# Patient Record
Sex: FEMALE | Race: OTHER | Employment: UNEMPLOYED | URBAN - METROPOLITAN AREA
[De-identification: names, ages, dates, MRNs, and addresses within clinical notes are randomized per-mention and may not be internally consistent; named-entity substitution may affect disease eponyms.]

---

## 2020-07-09 ENCOUNTER — HOSPITAL ENCOUNTER (EMERGENCY)
Age: 35
Discharge: HOME | End: 2020-07-09
Attending: EMERGENCY MEDICINE
Payer: MEDICAID

## 2020-07-09 ENCOUNTER — APPOINTMENT (OUTPATIENT)
Dept: GENERAL RADIOLOGY | Age: 35
End: 2020-07-09
Payer: MEDICAID

## 2020-07-09 VITALS
RESPIRATION RATE: 15 BRPM | HEART RATE: 83 BPM | HEIGHT: 64 IN | SYSTOLIC BLOOD PRESSURE: 122 MMHG | WEIGHT: 167 LBS | BODY MASS INDEX: 28.51 KG/M2 | OXYGEN SATURATION: 98 % | DIASTOLIC BLOOD PRESSURE: 78 MMHG | TEMPERATURE: 97.9 F

## 2020-07-09 PROCEDURE — U0003 INFECTIOUS AGENT DETECTION BY NUCLEIC ACID (DNA OR RNA); SEVERE ACUTE RESPIRATORY SYNDROME CORONAVIRUS 2 (SARS-COV-2) (CORONAVIRUS DISEASE [COVID-19]), AMPLIFIED PROBE TECHNIQUE, MAKING USE OF HIGH THROUGHPUT TECHNOLOGIES AS DESCRIBED BY CMS-2020-01-R: HCPCS

## 2020-07-09 PROCEDURE — 93005 ELECTROCARDIOGRAM TRACING: CPT | Performed by: EMERGENCY MEDICINE

## 2020-07-09 PROCEDURE — 99284 EMERGENCY DEPT VISIT MOD MDM: CPT

## 2020-07-09 PROCEDURE — 71045 X-RAY EXAM CHEST 1 VIEW: CPT

## 2020-07-09 RX ORDER — AZITHROMYCIN 250 MG/1
TABLET, FILM COATED ORAL
Qty: 1 PACKET | Refills: 0 | Status: SHIPPED | OUTPATIENT
Start: 2020-07-09 | End: 2020-07-13

## 2020-07-09 ASSESSMENT — ENCOUNTER SYMPTOMS
SHORTNESS OF BREATH: 0
ABDOMINAL PAIN: 0
BACK PAIN: 0
VOMITING: 0
RHINORRHEA: 0
COUGH: 1
NAUSEA: 0
EYES NEGATIVE: 1

## 2020-07-09 ASSESSMENT — PAIN DESCRIPTION - FREQUENCY: FREQUENCY: CONTINUOUS

## 2020-07-09 ASSESSMENT — PAIN DESCRIPTION - PAIN TYPE: TYPE: ACUTE PAIN

## 2020-07-09 ASSESSMENT — PAIN DESCRIPTION - DESCRIPTORS: DESCRIPTORS: PRESSURE

## 2020-07-09 ASSESSMENT — PAIN DESCRIPTION - LOCATION: LOCATION: CHEST

## 2020-07-09 ASSESSMENT — PAIN DESCRIPTION - ORIENTATION: ORIENTATION: MID

## 2020-07-09 ASSESSMENT — PAIN DESCRIPTION - ONSET: ONSET: ON-GOING

## 2020-07-09 ASSESSMENT — PAIN SCALES - GENERAL: PAINLEVEL_OUTOF10: 3

## 2020-07-09 NOTE — ED PROVIDER NOTES
[address]      Pt Name: Bertha Diego  MRN: [id number]  [place] 1985  Date of evaluation: 7/9/2020  Provider: Monse Evans MD    [address] [place] [address]       Chief Complaint   Patient presents with    Chest Pain     for one dayhaving midsternal chest pain radiating to upper back area. denies sob or NV, states has a cough with some phlegm         HISTORY OF PRESENT ILLNESS   (Location/Symptom, Timing/Onset, Context/Setting, Quality, Duration, Modifying Factors, Severity)  Note limiting factors. Bertha Diego is a 37 y.o. female who presents to the emergency department for chest pain. Onset this morning, woke her up from sleep. Aching and pressure, nonradiating, no change w/ inspiration/exertion/position. Associated shortness of breath, nausea, vomiting, diaphoresis. Felt like she had a lot of phlegm, pain improved after she was able to clear phlegm. No hx of heart disease. No hx of VTE, leg pain/swelling, hemoptysis, syncope, recent surgery/immobilization. No tobacco use. No drug use. Occasional alcohol use. History provided by patient via in person interpretation by daughter. Declined telephone . Chart review: hx of DM on metformin    Nursing Notes were reviewed. REVIEW OF SYSTEMS    (2-9 systems for level 4, 10 or more for level 5)     Review of Systems   Constitutional: Negative for appetite change, chills and fever. HENT: Negative for congestion and rhinorrhea. Eyes: Negative. Respiratory: Positive for cough. Negative for shortness of breath. Cardiovascular: Positive for chest pain. Negative for leg swelling. Gastrointestinal: Negative for abdominal pain, nausea and vomiting. Genitourinary: Negative for decreased urine volume, difficulty urinating, dysuria, frequency and urgency. Musculoskeletal: Negative for back pain and neck pain. Skin: Negative.     Neurological: Negative for dizziness, syncope, weakness, light-headedness and numbness. Except as noted above the remainder of the review of systems was reviewed and negative. PAST MEDICAL HISTORY     Past Medical History:   Diagnosis Date    Diabetes mellitus ([address]          SURGICAL HISTORY     History reviewed. No pertinent surgical history. CURRENT MEDICATIONS       Previous Medications    METFORMIN (GLUCOPHAGE) 500 MG TABLET    Take 500 mg by mouth 2 times daily (with meals)       ALLERGIES     Patient has no allergy information on record. FAMILY HISTORY     History reviewed. No pertinent family history.        SOCIAL HISTORY       Social History     Socioeconomic History    Marital status:      Spouse name: None    Number of children: None    Years of education: None    Highest education level: None   Occupational History    None   Social Needs    Financial resource strain: None    Food insecurity     Worry: None     Inability: None    Transportation needs     Medical: None     Non-medical: None   Tobacco Use    Smoking status: Never Smoker    Smokeless tobacco: Never Used   Substance and Sexual Activity    Alcohol use: Yes     Comment: social    Drug use: Never    Sexual activity: None   Lifestyle    Physical activity     Days per week: None     Minutes per session: None    Stress: None   Relationships    Social connections     Talks on phone: None     Gets together: None     Attends Holiness service: None     Active member of club or organization: None     Attends meetings of clubs or organizations: None     Relationship status: None    Intimate partner violence     Fear of current or ex partner: None     Emotionally abused: None     Physically abused: None     Forced sexual activity: None   Other Topics Concern    None   Social History Narrative    None         PHYSICAL EXAM    (up to 7 for level 4, 8 or more for level 5)     ED Triage Vitals   BP Temp Temp src Pulse Resp SpO2 Height Weight   07/09/20 1336 07/09/20 1329 -- 07/09/20 1329 -- 07/09/20 1329 07/09/20 1334 07/09/20 1334   116/87 98.2 °F (36.8 °C)  98  97 % 5' 4\" (1.626 m) 167 lb (75.8 kg)       Physical Exam  Vitals signs and nursing note reviewed. Constitutional:       General: She is not in acute distress. Appearance: She is not toxic-appearing. HENT:      Mouth/Throat:      Mouth: Mucous membranes are moist.      Pharynx: No oropharyngeal exudate or posterior oropharyngeal erythema. Eyes:      General: No scleral icterus. Extraocular Movements: Extraocular movements intact. Pupils: Pupils are equal, round, and reactive to light. Neck:      Musculoskeletal: Normal range of motion and neck supple. No neck rigidity. Cardiovascular:      Rate and Rhythm: Normal rate and regular rhythm. Pulses: Normal pulses. Heart sounds: Normal heart sounds. No murmur. Pulmonary:      Effort: Pulmonary effort is normal. No respiratory distress. Breath sounds: Normal breath sounds. No wheezing or rales. Abdominal:      General: There is no distension. Palpations: Abdomen is soft. Tenderness: There is no abdominal tenderness. Musculoskeletal: Normal range of motion. General: No swelling or tenderness. Skin:     General: Skin is warm and dry. Neurological:      Mental Status: She is alert and oriented to person, place, and time.       Comments: Strength 5/5 and sensation grossly intact to light touch and equal bilaterally in all extremities         DIAGNOSTIC RESULTS     EKG: All EKG's are interpreted by the Emergency Department Physician who either signs or Co-signs this chart in the absence of a cardiologist.    Normal sinus rhythm, vent rate 77bpm, normal axis and intervals, no acute injury pattern, no prior EKG on file for comparison    RADIOLOGY:   Non-plain film images such as CT, Ultrasound and MRI are read by the radiologist. Plain radiographic images are visualized and preliminarily interpreted by the emergency physician with the below findings:    CXR concerning for bilateral lower lung field airspace disease    Interpretation per the Radiologist below, if available at the time of this note:    XR CHEST PORTABLE   Final Result   Findings concerning for airspace disease in the lower lungs. ED BEDSIDE ULTRASOUND:   Performed by ED Physician - none    LABS:  Labs Reviewed   PREGNANCY, URINE   COVID-19   COVID-19   COVID-19     COVID-19 ordered and pending at time of discharge    All other labs were within normal range or not returned as of this dictation. EMERGENCY DEPARTMENT COURSE and DIFFERENTIAL DIAGNOSIS/MDM:   Vitals:    Vitals:    07/09/20 1329 07/09/20 1334 07/09/20 1336 07/09/20 1407   BP:   116/87    Pulse: 98      Temp: 98.2 °F (36.8 °C)   98.2 °F (36.8 °C)   TempSrc:    Oral   SpO2: 97%      Weight:  167 lb (75.8 kg)     Height:  5' 4\" (1.626 m)         42yo female w/ hx DM presenting w/ chest pain, resolved at time of evaluation. Well appearing, afebrile, hemodynamically stable, and in no acute distress. Breathing comfortably on room air. Low risk HEART score and PERC negative. EKG not suggestive of acute myocardial ischemia. Story not strongly concerning for pneumonia, however CXR shows bilateral airspace opacities. Possible concern for COVID, will swab with plan for outpatient follow up of results. Advised 14 day self quarantine at home. Outpatient PCP referral made. Will treat for CAP at this time given CXR findings. Discussed findings and detailed return precautions with patient and daughter and patient agreed w/ plan for discharge home and outpatient f/u with PCP. PROCEDURES:  Unless otherwise noted below, none     Procedures    FINAL IMPRESSION      1. Chest pain, unspecified type    2.  Community acquired pneumonia, unspecified laterality          DISPOSITION/PLAN   DISPOSITION Decision To Discharge 07/09/2020 03:08:24 PM      PATIENT REFERRED Marium Robledo PCP Referral Line  [phone]    Call for next available follow up appointment      DISCHARGE MEDICATIONS:  New Prescriptions    AZITHROMYCIN (ZITHROMAX Z-ANEESH) 250 MG TABLET    Take 2 tablets (500 mg) on Day 1, and then take 1 tablet (250 mg) on days 2 through 5. Controlled Substances Monitoring:     No flowsheet data found.     (Please note that portions of this note were completed with a voice recognition program.  Efforts were made to edit the dictations but occasionally words are mis-transcribed.)    Walker Haddad MD (electronically signed)  Attending Emergency Physician            Walker Haddad MD  07/09/20 2039

## 2020-07-09 NOTE — ED NOTES
Bed: 34  Expected date:   Expected time:   Means of arrival:   Comments:  Bill Palacios, BALDO  07/09/20 2778 Refilled Simvastatin per protocol

## 2020-07-10 ENCOUNTER — CARE COORDINATION (OUTPATIENT)
Dept: CARE COORDINATION | Age: 35
End: 2020-07-10

## 2020-07-10 LAB
EKG ATRIAL RATE: 77 BPM
EKG P AXIS: 36 DEGREES
EKG P-R INTERVAL: 182 MS
EKG Q-T INTERVAL: 368 MS
EKG QRS DURATION: 74 MS
EKG QTC CALCULATION (BAZETT): 416 MS
EKG R AXIS: 17 DEGREES
EKG T AXIS: 26 DEGREES
EKG VENTRICULAR RATE: 77 BPM

## 2020-07-10 PROCEDURE — 93010 ELECTROCARDIOGRAM REPORT: CPT | Performed by: INTERNAL MEDICINE

## 2020-07-10 NOTE — CARE COORDINATION
COVID-19 ED/Flu Clinic Initial Outreach Note    This Teto Michele made an attempt to contact the patient using [email or web address]  services by telephone to perform post discharge call. There was no answer and no voice mailbox to leave a message. Will try once more tomorrow.      Patient was seen for chest pain

## 2020-07-11 ENCOUNTER — CARE COORDINATION (OUTPATIENT)
Dept: CARE COORDINATION | Age: 35
End: 2020-07-11

## 2020-07-11 LAB
SARS-COV-2: NOT DETECTED
SOURCE: NORMAL

## 2020-07-11 NOTE — CARE COORDINATION
COVID-19 ED/Flu Clinic Initial Outreach Note      This Ismael Mancera made second attempt to contact the patient using [employer]  services by telephone to perform post discharge call. Not able to leave message. Will no longer make an outreach.

## 2021-03-17 ENCOUNTER — NURSE TRIAGE (OUTPATIENT)
Dept: OTHER | Facility: CLINIC | Age: 36
End: 2021-03-17

## 2021-03-17 ENCOUNTER — TELEPHONE (OUTPATIENT)
Dept: ADMINISTRATIVE | Age: 36
End: 2021-03-17

## 2021-03-17 NOTE — TELEPHONE ENCOUNTER
Patient called [facility]ran at [facility] with red flag complaint to establish care with unknown PCP. Brief description of triage: see below    Pt transferred with [facility], [facility] interpretor. Triage indicates for patient to be seen within 2 weeks in office. Pt agreeable to POC. Care advice provided, patient verbalizes understanding; denies any other questions or concerns; instructed to call back for any new or worsening symptoms. Writer provided warm transfer to [address] [place] [address] at [facility] for appointment scheduling. Attention Provider: Thank you for allowing me to participate in the care of your patient. The patient was connected to triage in response to information provided to the ECC. Please do not respond through this encounter as the response is not directed to a shared pool. Reason for Disposition   Abdominal pain is a chronic symptom (recurrent or ongoing AND lasting > 4 weeks)    Answer Assessment - Initial Assessment Questions  1. LOCATION: \"Where does it hurt? \"       Pt stated pain is in the middle of her lower abdomen    2. RADIATION: \"Does the pain shoot anywhere else? \" (e.g., chest, back)      Pt stated sometimes radiates to someplace else    3. ONSET: \"When did the pain begin? \" (e.g., minutes, hours or days ago)       Pt stated the abdominal pain has been going on for 10 years with worsening    4. SUDDEN: \"Gradual or sudden onset? \"      Gradual per pt    5. PATTERN \"Does the pain come and go, or is it constant? \"     - If constant: \"Is it getting better, staying the same, or worsening? \"       (Note: Constant means the pain never goes away completely; most serious pain is constant and it progresses)      - If intermittent: \"How long does it last?\" \"Do you have pain now? \"      (Note: Intermittent means the pain goes away completely between bouts)      Intermittent; denies pain at this time    6. SEVERITY: \"How bad is the pain? \"  (e.g., Scale 1-10; mild, moderate, or severe)    - MILD (1-3): doesn't interfere with normal activities, abdomen soft and not tender to touch     - MODERATE (4-7): interferes with normal activities or awakens from sleep, tender to touch     - SEVERE (8-10): excruciating pain, doubled over, unable to do any normal activities       Denies pain at this time - states pain is an 8/10 when she does have pain    7. RECURRENT SYMPTOM: \"Have you ever had this type of abdominal pain before? \" If so, ask: \"When was the last time? \" and \"What happened that time? \"       Recurrent per pt    8. CAUSE: \"What do you think is causing the abdominal pain? \"      Pt states she was told she has ovarian cysts     9. RELIEVING/AGGRAVATING FACTORS: \"What makes it better or worse? \" (e.g., movement, antacids, bowel movement)      Pt states sometimes when she takes pain killers the pain improves    10. OTHER SYMPTOMS: \"Has there been any vomiting, diarrhea, constipation, or urine problems? \"        Pt states she feels like she needs to urinate (urgency) and also has constipation when she has the abdominal pain    11. PREGNANCY: \"Is there any chance you are pregnant? \" \"When was your last menstrual period? \"        Pt denies; 03/17/21 LMP    Protocols used: ABDOMINAL PAIN - [facility] - JONO MOSQUERA

## 2021-03-17 NOTE — TELEPHONE ENCOUNTER
Patient called in having abd pain not an established pt transferred call to [address] with  Erica Friedman.

## 2021-03-18 ENCOUNTER — OFFICE VISIT (OUTPATIENT)
Dept: PRIMARY CARE CLINIC | Age: 36
End: 2021-03-18
Payer: COMMERCIAL

## 2021-03-18 VITALS
TEMPERATURE: 97.8 F | RESPIRATION RATE: 16 BRPM | HEART RATE: 85 BPM | OXYGEN SATURATION: 99 % | SYSTOLIC BLOOD PRESSURE: 119 MMHG | DIASTOLIC BLOOD PRESSURE: 85 MMHG

## 2021-03-18 DIAGNOSIS — N94.6 DYSMENORRHEA: Primary | ICD-10-CM

## 2021-03-18 PROCEDURE — G8484 FLU IMMUNIZE NO ADMIN: HCPCS | Performed by: NURSE PRACTITIONER

## 2021-03-18 PROCEDURE — 1036F TOBACCO NON-USER: CPT | Performed by: NURSE PRACTITIONER

## 2021-03-18 PROCEDURE — G8427 DOCREV CUR MEDS BY ELIG CLIN: HCPCS | Performed by: NURSE PRACTITIONER

## 2021-03-18 PROCEDURE — G8419 CALC BMI OUT NRM PARAM NOF/U: HCPCS | Performed by: NURSE PRACTITIONER

## 2021-03-18 PROCEDURE — 99213 OFFICE O/P EST LOW 20 MIN: CPT | Performed by: NURSE PRACTITIONER

## 2021-03-22 ENCOUNTER — HOSPITAL ENCOUNTER (EMERGENCY)
Age: 36
Discharge: HOME | End: 2021-03-22
Payer: COMMERCIAL

## 2021-03-22 VITALS
DIASTOLIC BLOOD PRESSURE: 89 MMHG | OXYGEN SATURATION: 97 % | RESPIRATION RATE: 16 BRPM | SYSTOLIC BLOOD PRESSURE: 134 MMHG | HEART RATE: 108 BPM | TEMPERATURE: 97.1 F

## 2021-03-22 DIAGNOSIS — Z76.0 ENCOUNTER FOR MEDICATION REFILL: ICD-10-CM

## 2021-03-22 DIAGNOSIS — N76.0 VAGINITIS AND VULVOVAGINITIS: Primary | ICD-10-CM

## 2021-03-22 LAB
BACTERIA: ABNORMAL /HPF
BILIRUBIN URINE: NEGATIVE
BLOOD, URINE: ABNORMAL
CHP ED QC CHECK: NORMAL
CLARITY: CLEAR
CLUE CELLS: NORMAL
COLOR: YELLOW
GLUCOSE BLD-MCNC: 241 MG/DL
GLUCOSE URINE: >=1000 MG/DL
HCG, URINE, POC: NEGATIVE
KETONES, URINE: NEGATIVE MG/DL
LEUKOCYTE ESTERASE, URINE: NEGATIVE
Lab: NORMAL
METER GLUCOSE: 241 MG/DL (ref 74–99)
NEGATIVE QC PASS/FAIL: NORMAL
NITRITE, URINE: NEGATIVE
PH UA: 5.5 (ref 5–9)
POSITIVE QC PASS/FAIL: NORMAL
PROTEIN UA: NEGATIVE MG/DL
RBC UA: ABNORMAL /HPF (ref 0–2)
SOURCE WET PREP: NORMAL
SPECIFIC GRAVITY UA: 1.02 (ref 1–1.03)
TRICHOMONAS PREP: NORMAL
UROBILINOGEN, URINE: 1 E.U./DL
WBC UA: ABNORMAL /HPF (ref 0–5)
YEAST WET PREP: NORMAL

## 2021-03-22 PROCEDURE — 6360000002 HC RX W HCPCS: Performed by: NURSE PRACTITIONER

## 2021-03-22 PROCEDURE — 2580000003 HC RX 258

## 2021-03-22 PROCEDURE — 99283 EMERGENCY DEPT VISIT LOW MDM: CPT

## 2021-03-22 PROCEDURE — 6370000000 HC RX 637 (ALT 250 FOR IP): Performed by: NURSE PRACTITIONER

## 2021-03-22 PROCEDURE — 96372 THER/PROPH/DIAG INJ SC/IM: CPT

## 2021-03-22 PROCEDURE — 87591 N.GONORRHOEAE DNA AMP PROB: CPT

## 2021-03-22 PROCEDURE — 87210 SMEAR WET MOUNT SALINE/INK: CPT

## 2021-03-22 PROCEDURE — 82962 GLUCOSE BLOOD TEST: CPT

## 2021-03-22 PROCEDURE — 81001 URINALYSIS AUTO W/SCOPE: CPT

## 2021-03-22 PROCEDURE — 87491 CHLMYD TRACH DNA AMP PROBE: CPT

## 2021-03-22 RX ORDER — CEFTRIAXONE 1 G/1
500 INJECTION, POWDER, FOR SOLUTION INTRAMUSCULAR; INTRAVENOUS ONCE
Status: COMPLETED | OUTPATIENT
Start: 2021-03-22 | End: 2021-03-22

## 2021-03-22 RX ORDER — AZITHROMYCIN 250 MG/1
1000 TABLET, FILM COATED ORAL ONCE
Status: COMPLETED | OUTPATIENT
Start: 2021-03-22 | End: 2021-03-22

## 2021-03-22 RX ADMIN — AZITHROMYCIN 1000 MG: 250 TABLET, FILM COATED ORAL at 17:07

## 2021-03-22 RX ADMIN — WATER 10 ML: 1 INJECTION INTRAMUSCULAR; INTRAVENOUS; SUBCUTANEOUS at 17:07

## 2021-03-22 RX ADMIN — CEFTRIAXONE SODIUM 500 MG: 1 INJECTION, POWDER, FOR SOLUTION INTRAMUSCULAR; INTRAVENOUS at 17:07

## 2021-03-22 NOTE — ED PROVIDER NOTES
1.005 - 1.030    Blood, Urine TRACE-INTACT Negative    pH, UA 5.5 5.0 - 9.0    Protein, UA Negative Negative mg/dL    Urobilinogen, Urine 1.0 <2.0 E.U./dL    Nitrite, Urine Negative Negative    Leukocyte Esterase, Urine Negative Negative    WBC, UA 0-1 0 - 5 /HPF    RBC, UA 0-1 0 - 2 /HPF    Bacteria, UA RARE (A) None Seen /HPF   POC Pregnancy Urine Qual   Result Value Ref Range    HCG, Urine, POC Negative Negative    Lot Number [id number]     Positive QC Pass/Fail Acceptable     Negative QC Pass/Fail Acceptable    POCT glucose   Result Value Ref Range    Glucose 241 mg/dL    QC OK? ok    POCT Glucose   Result Value Ref Range    Meter Glucose 241 (H) 74 - 99 mg/dL     Imaging: All Radiology results interpreted by Radiologist unless otherwise noted. No orders to display     ED Course / Medical Decision Making     Medications   cefTRIAXone (ROCEPHIN) injection 500 mg (500 mg Intramuscular Given 3/22/21 1707)   azithromycin (ZITHROMAX) tablet 1,000 mg (1,000 mg Oral Given 3/22/21 1707)   sterile water injection (10 mLs  Given 3/22/21 1707)         Consults:   None    Procedures:   none    MDM:   Patient is well-appearing, afebrile. Vital signs stable. Presents with vaginitis ongoing for quite some time. UA obtained greater than 1000 glucose otherwise reassuring. POCT glucose 241, will refill metformin. wet prep obtained and negative GC and Chlamydia cultures obtained and pending. Patient has no cervical motion tenderness however cervical lesions noted. Patient advised on safe sex practices and the importance of outpatient follow-up with PCP as well as women's clinic. She was educated on signs and symptoms which require emergent evaluation. Counseling:  Myself reviewed today's visit with the patient in addition to providing specific details for the plan of care and counseling regarding the diagnosis and prognosis. Questions are answered at this time and are agreeable with the plan. Assessment      1.
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
## 2021-03-25 LAB
C. TRACHOMATIS DNA ,URINE: NEGATIVE
N. GONORRHOEAE DNA, URINE: NEGATIVE
SOURCE: NORMAL

## 2021-04-13 ENCOUNTER — OFFICE VISIT (OUTPATIENT)
Dept: FAMILY MEDICINE CLINIC | Age: 36
End: 2021-04-13
Payer: COMMERCIAL

## 2021-04-13 VITALS
SYSTOLIC BLOOD PRESSURE: 101 MMHG | RESPIRATION RATE: 16 BRPM | DIASTOLIC BLOOD PRESSURE: 63 MMHG | TEMPERATURE: 97.2 F | HEIGHT: 64 IN | HEART RATE: 89 BPM | WEIGHT: 159 LBS | OXYGEN SATURATION: 99 % | BODY MASS INDEX: 27.14 KG/M2

## 2021-04-13 DIAGNOSIS — Z11.4 SCREENING FOR HIV (HUMAN IMMUNODEFICIENCY VIRUS): ICD-10-CM

## 2021-04-13 DIAGNOSIS — E11.65 UNCONTROLLED TYPE 2 DIABETES MELLITUS WITH HYPERGLYCEMIA (HCC): Primary | ICD-10-CM

## 2021-04-13 DIAGNOSIS — Z87.42 HISTORY OF OVARIAN CYST: ICD-10-CM

## 2021-04-13 DIAGNOSIS — Z11.59 ENCOUNTER FOR HEPATITIS C SCREENING TEST FOR LOW RISK PATIENT: ICD-10-CM

## 2021-04-13 PROBLEM — E11.40 CONTROLLED TYPE 2 DIABETES MELLITUS WITH DIABETIC NEUROPATHY, WITHOUT LONG-TERM CURRENT USE OF INSULIN (HCC): Status: RESOLVED | Noted: 2021-04-13 | Resolved: 2021-04-13

## 2021-04-13 PROBLEM — N83.209 OVARIAN CYST: Status: ACTIVE | Noted: 2021-04-13

## 2021-04-13 PROBLEM — E11.40 CONTROLLED TYPE 2 DIABETES MELLITUS WITH DIABETIC NEUROPATHY, WITHOUT LONG-TERM CURRENT USE OF INSULIN (HCC): Status: ACTIVE | Noted: 2021-04-13

## 2021-04-13 LAB
CHP ED QC CHECK: NORMAL
GLUCOSE BLD-MCNC: 266 MG/DL
HBA1C MFR BLD: 9.5 %

## 2021-04-13 PROCEDURE — 1036F TOBACCO NON-USER: CPT | Performed by: FAMILY MEDICINE

## 2021-04-13 PROCEDURE — 99212 OFFICE O/P EST SF 10 MIN: CPT | Performed by: FAMILY MEDICINE

## 2021-04-13 PROCEDURE — G8419 CALC BMI OUT NRM PARAM NOF/U: HCPCS | Performed by: FAMILY MEDICINE

## 2021-04-13 PROCEDURE — 3046F HEMOGLOBIN A1C LEVEL >9.0%: CPT | Performed by: FAMILY MEDICINE

## 2021-04-13 PROCEDURE — 2022F DILAT RTA XM EVC RTNOPTHY: CPT | Performed by: FAMILY MEDICINE

## 2021-04-13 PROCEDURE — 99203 OFFICE O/P NEW LOW 30 MIN: CPT | Performed by: FAMILY MEDICINE

## 2021-04-13 PROCEDURE — 82962 GLUCOSE BLOOD TEST: CPT | Performed by: FAMILY MEDICINE

## 2021-04-13 PROCEDURE — G8427 DOCREV CUR MEDS BY ELIG CLIN: HCPCS | Performed by: FAMILY MEDICINE

## 2021-04-13 PROCEDURE — 83036 HEMOGLOBIN GLYCOSYLATED A1C: CPT | Performed by: FAMILY MEDICINE

## 2021-04-13 RX ORDER — IBUPROFEN 200 MG
200 TABLET ORAL EVERY 6 HOURS PRN
COMMUNITY
End: 2022-02-01 | Stop reason: ALTCHOICE

## 2021-04-13 ASSESSMENT — ENCOUNTER SYMPTOMS
SINUS PRESSURE: 0
SINUS PAIN: 0
VOMITING: 0
COUGH: 0
ABDOMINAL PAIN: 1
EYE ITCHING: 0
CONSTIPATION: 1
SHORTNESS OF BREATH: 0
NAUSEA: 1
EYE PAIN: 0
DIARRHEA: 0

## 2021-04-13 NOTE — PATIENT INSTRUCTIONS
Patient Education        Learning About Meal Planning for Diabetes  Why plan your meals? Meal planning can be a key part of managing diabetes. Planning meals and snacks with the right balance of carbohydrate, protein, and fat can help you keep your blood sugar at the target level you set with your doctor. You don't have to eat special foods. You can eat what your family eats, including sweets once in a while. But you do have to pay attention to how often you eat and how much you eat of certain foods. You may want to work with a dietitian or a certified diabetes educator. He or she can give you tips and meal ideas and can answer your questions about meal planning. This health professional can also help you reach a healthy weight if that is one of your goals. What plan is right for you? Your dietitian or diabetes educator may suggest that you start with the plate format or carbohydrate counting. The plate format  The plate format is a simple way to help you manage how you eat. You plan meals by learning how much space each food should take on a plate. Using the plate format helps you spread carbohydrate throughout the day. It can make it easier to keep your blood sugar level within your target range. It also helps you see if you're eating healthy portion sizes. To use the plate format, you put non-starchy vegetables on half your plate. Add meat or meat substitutes on one-quarter of the plate. Put a grain or starchy vegetable (such as brown rice or a potato) on the final quarter of the plate. You can add a small piece of fruit and some low-fat or fat-free milk or yogurt, depending on your carbohydrate goal for each meal.  Here are some tips for using the plate format:  · Make sure that you are not using an oversized plate. A 9-inch plate is best. Many restaurants use larger plates. · Get used to using the plate format at home. Then you can use it when you eat out.   · Write down your questions about using the plate format. Talk to your doctor, a dietitian, or a diabetes educator about your concerns. Carbohydrate counting  With carbohydrate counting, you plan meals based on the amount of carbohydrate in each food. Carbohydrate raises blood sugar higher and more quickly than any other nutrient. It is found in desserts, breads and cereals, and fruit. It's also found in starchy vegetables such as potatoes and corn, grains such as rice and pasta, and milk and yogurt. Spreading carbohydrate throughout the day helps keep your blood sugar levels within your target range. Your daily amount depends on several things, including your weight, how active you are, which diabetes medicines you take, and what your goals are for your blood sugar levels. A registered dietitian or diabetes educator can help you plan how much carbohydrate to include in each meal and snack. A guideline for your daily amount of carbohydrate is:  · 45 to 60 grams at each meal. That's about the same as 3 to 4 carbohydrate servings. · 15 to 20 grams at each snack. That's about the same as 1 carbohydrate serving. The Nutrition Facts label on packaged foods tells you how much carbohydrate is in a serving of the food. First, look at the serving size on the food label. Is that the amount you eat in a serving? All of the nutrition information on a food label is based on that serving size. So if you eat more or less than that, you'll need to adjust the other numbers. Total carbohydrate is the next thing you need to look for on the label. If you count carbohydrate servings, one serving of carbohydrate is 15 grams. For foods that don't come with labels, such as fresh fruits and vegetables, you'll need a guide that lists carbohydrate in these foods. Ask your doctor, dietitian, or diabetes educator about books or other nutrition guides you can use.   If you take insulin, you need to know how many grams of carbohydrate are in a meal. This lets you know how much rapid-acting insulin to take before you eat. If you use an insulin pump, you get a constant rate of insulin during the day. So the pump must be programmed at meals to give you extra insulin to cover the rise in blood sugar after meals. When you know how much carbohydrate you will eat, you can take the right amount of insulin. Or, if you always use the same amount of insulin, you need to make sure that you eat the same amount of carbohydrate at meals. If you need more help to understand carbohydrate counting and food labels, ask your doctor, dietitian, or diabetes educator. How can you plan healthy meals? Here are some tips to get started:  · Plan your meals a week at a time. Don't forget to include snacks too. · Use cookbooks or online recipes to plan several main meals. Plan some quick meals for busy nights. You also can double some recipes that freeze well. Then you can save half for other busy nights when you don't have time to cook. · Make sure you have the ingredients you need for your recipes. If you're running low on basic items, put these items on your shopping list too. · List foods that you use to make breakfasts, lunches, and snacks. List plenty of fruits and vegetables. · Post this list on the refrigerator. Add to it as you think of more things you need. · Take the list to the store to do your weekly shopping. Follow-up care is a key part of your treatment and safety. Be sure to make and go to all appointments, and call your doctor if you are having problems. It's also a good idea to know your test results and keep a list of the medicines you take. Where can you learn more? Go to [email or web address] org and sign in to your [employer] account. Enter X479 in the [facility] box to learn more about \"Learning About Meal Planning for Diabetes. \"     If you do not have an account, please click on the \"Sign Up Now\" link.   Current as of: August 31, 2020               Content Version: 12.8  © 1937-6135 [employer], Incorporated. Care instructions adapted under license by [facility] ([facility]). If you have questions about a medical condition or this instruction, always ask your healthcare professional. [address] any warranty or liability for your use of this information. Patient Education        Prueba casera del nivel de azúcar en la miesha: Sobre esta prueba  Home Blood Sugar Test: About This Test  ¿Qué es? Nicole prueba casera de azúcar en la miesha mide la cantidad de azúcar (glucosa) en la miesha, con un pequeño dispositivo que se llama medidor de azúcar en la miesha. Es Valeria Earnest rápida de revisarse el azúcar en la miesha en cualquier momento y en cualquier lugar. ¿Por qué se hace esta prueba? Revisarse el azúcar en la [facility] ayuda a saber si paola niveles se hallan dentro de paola límites ideales. Le ayuda a saber cuándo nery medidas y puede ayudarle a evitar emergencias relacionadas con el nivel de azúcar en la miesha. Revisarse también le ayuda a saber el modo en que cosas trista el ejercicio, el estrés y lo que come pueden afectarle el azúcar en la miesha. ¿Qué ocurre antes de la prueba? Los suministros que necesitará para revisarse el azúcar en la miesha incluyen:  · Medidor de glucosa en la miesha. · Tiras reactivas. Estas están fabricadas para ser usadas con un modelo específico de medidor. Asegúrese de que las tiras no hayan caducado. · Soluciones de control del azúcar. Algunos medidores requieren nicole solución específica. Muchos medidores nuevos están fabricados para funcionar sin solución de control. · Agujas cortas llamadas lancetas para pincharse la piel. · Sostén para la lanceta (dispositivo de lanceta) del tamaño de un lápiz. Martha coloca la lanceta en edwards lugar y controla la profundidad a la que penetra en la piel. · Bolitas de algodón limpias. Estas sirven para detener el sangrado en el lugar de la prueba.   ¿Qué ocurre ever la en miesha. Vanita, dependiendo de [employer], usted y edwards médico podrían establecer unos límites diferentes para usted. Para adultos con diabetes que no shanice mujeres embarazadas  · De 80 a 130 miligramos por decilitro (mg/dL) antes de nicole comida  · Menos de 180 mg/dL entre 1 y 2 horas después de nicole comida  Para mujeres que tienen diabetes y están embarazadas  · 95 mg/dL o menos antes del desayuno  · De 120 a 140 mg/dL (o inferior) entre 1 y 2 horas después de nicole comida  ¿Dónde puede encontrar más información en inglés? Ilia McCook a [email or web address] e ingrese a edwards cuenta de Angelika. José Canas [address] en el Andre Leader \"Search Health Information\" para más información (en inglés) sobre \"Prueba casera del nivel de azúcar en la miesha: Sobre esta prueba. \"     Si no tiene nicole cuenta, shital alvaro en el enlace \"Sign Up Now\". Revisado: 31 agosto, 2020               Versión del contenido: 12.8  © 2006-2021 Healthwise, Incorporated. Las instrucciones de cuidado fueron adaptadas bajo licencia por [facility] ([facility]). Si usted tiene [place] afección médica o sobre estas instrucciones, siempre pregunte a edwards profesional de mary. Healthwise, Incorporated niega toda garantía o responsabilidad por edwards uso de esta información. Patient Education        Amirah Jointer de la diabetes y el ejercicio  Learning About Diabetes and Exercise  ¿Puede usted hacer ejercicio si tiene diabetes? Cuando usted tiene diabetes, es importante hacer ejercicio con regularidad. [place] ayuda a controlar edwards nivel de azúcar en la miesha. Aún puede practicar deportes, correr, montar en bicicleta, nadar y hacer otras actividades cuando tiene diabetes. ¿Cómo puede ayudarle el ejercicio a manejar la diabetes? El cuerpo [employer] alimentos que usted come en glucosa, un tipo de azúcar. Usted necesita jessy azúcar trista maritza de energía. Cuando usted tiene diabetes, el azúcar se le acumula en la miesha.  Vanita cuando hace ejercicio, el cuerpo [place] azúcar. [place] ayuda a impedir que se acumule en la miesha y resulta en un azúcar en la miesha más bajo y mejor control de la diabetes. El ejercicio puede ayudarle de otras formas [place]. Puede ayudarle a alcanzar un peso saludable y [place]. También ayuda a mejorar la presión arterial y el colesterol, lo cual puede reducir el riesgo de enfermedad cardíaca. El ejercicio puede hacerlo sentir más breonna y hewitt. Puede ayudarle a relajarse y a dormir mejor, y darle confianza en otras cosas que usted shital. ¿Cómo puede hacer ejercicio de forma luque? Antes de iniciar un programa de ejercicio nuevo, hable con fields médico de cómo y cuándo hacer ejercicio. [place] hacerse un examen médico y pruebas antes de comenzar. Algunos tipos de ejercicio pueden ser dañinos si fields diabetes está causando otros problemas, tales trista problemas de los pies. Fields médico puede decirle qué tipos de ejercicio son [place] opciones para usted. Estos consejos pueden ayudarle a hacer ejercicio de forma luque cuando tiene diabetes. Si fields diabetes está controlada por medio de nicole dieta o medicamentos que no disminuyen fields nivel de azúcar en la miesha, no es necesario que coma un refrigerio antes de hacer ejercicio. · Revísese el azúcar en la miesha antes de hacer ejercicio. Y tenga cuidado con lo que come. ? Si fields nivel de azúcar en la miesha es menos de 100, coma un refrigerio de carbohidratos antes de hacer ejercicio. ? Tenga cuidado cuando shital ejercicio si fields azúcar en la miesha está por encima de 300. Un nivel alto de azúcar en la miesha puede hacer que se deshidrate. Y eso hace que paola niveles de azúcar se eleven aún más. Si tiene [employer] o en la orina y fields nivel de azúcar en la miesha es superior a 300, no shital ejercicio. · No intente hacer demasiado al principio. Aumente fields programa de ejercicio poco a poco. Trate de hacer al menos 30 minutos de ejercicio la mayoría de los días de la [place].  Caminar es [place] opción. Puede que también desee hacer otras actividades, trista nadar o montar en bicicleta. O aline vez desee correr o practicar la jardinería. Trate de hacer ejercicios de fortalecimiento muscular al menos 2 veces por semana. Estos ejercicios incluyen flexiones y levantamiento de pesas. También puede usar tubos de goma o bandas elásticas. Usted estira o carloz del tubo o la stratton para aumentar edwards fuerza muscular. Si quiere hacer más ejercicio, incremente lentamente la intensidad o la duración del ejercicio. · Es posible que tenga síntomas de niveles bajos de azúcar en la miesha ever el ejercicio o hasta 24 horas después. Algunos síntomas de bajo azúcar en la miesha, trista sudoración, latidos cardíacos rápidos o sentirse cansado, pueden confundirse con lo que puede pasar toda vez que hace ejercicio. Otros síntomas pueden incluir sentirse ansioso, mareado, débil o tembloroso. De modo que es nicole buena idea volverse a revisar el azúcar en la miesha. · Usted puede tratar el nivel bajo de azúcar en la miesha comiendo o bebiendo algo que tenga 15 gramos de carbohidratos. Estos deberían ser alimentos con azúcar fácil de asimilar. Los alimentos con azúcar fácil de asimilar trista el jugo de frutas, la gaseosa regular (no dietética), las pastillas de glucosa, los caramelos duros o las uvas pasas pueden ayudar a elevar el azúcar en la miesha. Vuelva a revisarse el nivel de azúcar en la miesha 15 minutos después de ingerir un alimento con azúcar de fácil asimilación para asegurarse de que edwards nivel esté volviendo a los límites ideales para usted. · Nena agua en abundancia antes, ever y después de [address]. · Utilice joyas de alerta médica que digan que tiene diabetes. Puede comprarlas en la mayoría de las [place]. · [address] a edwards cuerpo.  Si está acostumbrado a hacer ejercicio y nota que no puede hacer tanto trista suele hacer, hable con edwards médico.  La atención de seguimiento es nicole parte clave de edwards tratamiento y seguridad. Asegúrese de hacer y acudir a todas las citas, y llame a edwards médico si está teniendo problemas. También es nicole buena idea saber los resultados de paola exámenes y mantener nicole lista de los medicamentos que rola. ¿Dónde puede encontrar más información en inglés? Marcy July a [email or web address] e ingrese a edwards cuenta de MyChart. Tamera Leventhal S[address] en el Margret Leonard \"Search Health Information\" para más información (en inglés) sobre \"Aprenda acerca de la diabetes y el ejercicio. \"     Si no tiene nicole cuenta, shital alvaro en el enlace \"Sign Up Now\". Revisado: 31 agosto, 2020               Versión del contenido: 12.8  © 4681-0760 Healthwise, Incorporated. Las instrucciones de cuidado fueron adaptadas bajo licencia por [facility] ([facility]). Si usted tiene [place] afección médica o sobre estas instrucciones, siempre pregunte a edwards profesional de mary. Healthwise, Incorporated niega toda garantía o responsabilidad por edwards uso de esta información. Patient Education        Aprenda sobre el recuento de carbohidratos y comer afuera cuando tiene diabetes  Learning About Carbohydrate (Carb) Counting and Eating Out When You Have Diabetes  ¿Por qué planificar las comidas? La planificación de comidas puede ser [place] parte crucial del manejo de la diabetes. Planificar las comidas y los refrigerios con el equilibrio correcto de carbohidratos, proteínas y grasas puede ayudarle a mantener los niveles de azúcar en la miesha dentro de los límites ideales que estableció junto con edwards médico.  No tiene que comer alimentos especiales. Puede comer lo mismo que edwards eliana, incluso dulces de vez en cuando. Vanita debe prestar atención a la cantidad y la frecuencia con que come ciertos alimentos. John vez desee colaborar con un dietista o un educador en diabetes certificado. Él o eileen puede darle consejos y sugerencias de comidas y puede responder a paola preguntas sobre la planificación de comidas.  modulR profesional sanitario también puede ayudarle a alcanzar un peso saludable si jonas es jorge de paola objetivos. ¿Qué debería saber acerca de ingerir carbohidratos? Manejar la cantidad de carbohidratos que ingiere es nicole parte importante de la alimentación saludable cuando tiene diabetes. Los carbohidratos se encuentran en muchos alimentos. · Sepa qué alimentos contienen carbohidratos. Y aprenda qué cantidad de carbohidratos contienen los diferentes alimentos. ? El pan, los cereales, la pasta y el arroz tienen aproximadamente 15 gramos de carbohidratos por porción. Nicole porción equivale a 1 rebanada de pan (1 onza o 28 g), ½ taza de cereal cocido o 1/3 de taza de pasta o arroz cocidos. ? Las frutas tienen 15 gramos de carbohidratos por porción. Ilah Round porción es 1 fruta fresca pequeña, trista nicole [place] shala o nicole naranja; ½ banana (plátano); ½ taza de fruta cocida o enlatada; ½ taza de jugo de fruta; 1 taza de melón o frambuesas; o 2 cucharadas de frutas secas. ? Marjorie Nations y el yogur sin azúcar agregado tienen 15 gramos de carbohidratos por porción. Ilah Round porción es 1 taza de [place] o 2/3 taza de yogur sin azúcar agregado. ? Las verduras con almidón tienen 15 gramos de carbohidratos por porción. Nicole porción es ½ taza de puré de papa o camote (batata, boniato); 1 taza de calabacín; ½ papa horneada pequeña; ½ taza de frijoles cocidos; o ½ taza de maíz (elote) o arvejas (chícharos) cocidos. · Aprenda cuántos carbohidratos debe consumir cada día y en cada comida. Un dietista o CDE le puede enseñar cómo llevar la cuenta de los carbohidratos que consume. A esto se le llama recuento de carbohidratos. · Si no está seguro de cómo [employer] gramos de carbohidratos, utilice el [place]étodo del [place] para planificar las comidas. Es nicole Lily Alvarez y rápida de asegurarse de que consuma comidas equilibradas. También le ayuda a distribuir los carbohidratos ever el día. ? Divida el plato por tipo de alimento.  Llene medio plato con verduras sin almidón, ponga carne u otras proteínas en nicole cuarta parte del plato y granos o verduras con almidón en el último cuarto del plato. A esto puede agregarle un pequeño pedazo de fruta y 3 taza de [place] o yogur, según la cantidad de carbohidratos que deba consumir en nicole comida. · Trate de comer aproximadamente la misma cantidad de carbohidratos en cada comida. No \"reserve\" edwards cantidad diaria de carbohidratos para consumirlos en nicole corie comida. · Las proteínas contienen muy pocos o nada de carbohidratos por porción. Los ejemplos de proteínas incluyen carne de res, [place], [place], [place], [place], tofu, Gregg-barre, requesón (\"cottage cheese\") y la mantequilla de cacahuate [place]). Nicole porción de carne son 3 onzas (85 g), lo cual es aproximadamente del tamaño de nicole baraja de naipes. Los ejemplos de porciones de sustitutos de la carne (equivalente a 1 onza o 28 g de carne) son 1/4 de taza de requesón, 1 huevo, 1 cucharada de [place] de cacahuate y ½ taza de tofu. ¿Cómo puede comer fuera y aún así comer de modo saludable? · Aprenda a calcular los tamaños de las porciones de alimentos que contienen carbohidratos. Si mide la comida en casa, será más fácil calcular la cantidad en nicole porción de comida de restaurante. · Si el platillo que pide contiene demasiados carbohidratos (trista cande, maíz o frijoles al horno), pida un alimento bajo en carbohidratos en edwards lugar. Pida nicole ensalada o verduras. · Si [place] insulina, revise edwards azúcar en la miesha antes y después de comer fuera para ayudarle a planear cuánto comer en el futuro. · Si usted come más carbohidratos de lo planeado en nicole comida, dé un paseo o shital otro tipo de ejercicio. [place] ayudará a reducir el azúcar en la miesha. ¿Cuáles son algunos consejos para comer aziza? · Limite las grasas saturadas, trista la grasa de la carne y productos lácteos. Esta es nicole opción saludable, porque las personas que tienen diabetes tienen un mayor riesgo de enfermedades del corazón. ayudan a controlar el azúcar en la miesha. Algunos tipos ayudan a que el organismo produzca insulina para reducir el azúcar en la miesha. Otros reducen la cantidad de insulina que necesita el organismo. Algunos pueden retrasar la velocidad con la que el cuerpo digiere los azúcares. Y algunos pueden eliminar el exceso de glucosa a través de la [place]. Es posible que deba nery más de un medicamento para la diabetes. Melanie Art medicamentos podrían tulio mejores resultados para reducir el nivel de azúcar en la miesha que jorge solo. · Metformina. Esta reduce la cantidad de glucosa que produce el hígado. Y le ayuda a reaccionar mejor a la insulina. También disminuye la cantidad de azúcar almacenada que libera el hígado cuando usted no está comiendo. · Sulfonilureas. Estas ayudan al organismo a liberar más insulina. Algunas actúan ever muchas horas. Pueden causar niveles bajos de azúcar en la miesha si no come según lo planeó. Un ejemplo es la glipizida. · Tiazolidinedionas. Estas reducen la cantidad de glucosa en la miesha. También le ayudan a reaccionar mejor a la insulina. Un ejemplo es la pioglitazona. · Inhibidores de SGLT2. Estos ayudan a eliminar el exceso de glucosa a través de la [place]. También pueden ayudar a algunas personas a adelgazar. Un ejemplo es la ertugliflozina. · Inhibidores de DPP-4. Estos ayudan al organismo a aumentar el nivel de insulina después de comer. También ayudan al organismo a producir elma cantidad de nicole hormona que aumenta el azúcar en la [place]. Un ejemplo es la alogliptina. · Hormonas incretinas (agonistas del receptor GLP-1). Estas ayudan al organismo a producir nicole proteína que puede elevar el nivel de insulina y hacer que tenga menos [place]. Se administran en forma de inyección o pastilla. Un ejemplo es la semaglutida. · Meglitinidas. Estas ayudan al organismo a liberar insulina. También ayudan a retardar la manera en que el organismo digiere los azúcares.  Por lo tanto, pueden evitar que el azúcar en la miesha se eleve demasiado rápido después de comer. · Inhibidores de la lucy-glucosidasa. Estos evitan la descomposición de los almidones. [place] significa que reducen la cantidad de glucosa que se absorbe cuando usted come. No ayudan a que edwards organismo produzca más insulina. Por lo tanto, no causarán niveles bajos de azúcar en la miesha a menos que los use junto con otros medicamentos para la diabetes. La atención de seguimiento es nicole parte clave de edwards tratamiento y seguridad. Asegúrese de hacer y acudir a todas las citas, y llame a edwards médico si está teniendo problemas. También es nicole buena idea saber los resultados de paola exámenes y mantener nicole lista de los medicamentos que rola. ¿Cómo puede cuidarse en el hogar? · Siga nicole dieta saludable. Jacqui algo de ejercicio todos los darinel. [place] puede ayudarle a reducir la cantidad de medicamentos que necesita. · No tome otros medicamentos recetados o de venta jus, vitaminas, productos herbarios o suplementos sin consultar kg a edwards médico. Algunos medicamentos para la diabetes de tipo 2 pueden causar problemas con otros medicamentos o suplementos. · Dígale a edwards médico si tiene planes de quedar embarazada. Algunos de [employer] no son seguros para las [address] · Sea carole con los medicamentos. [employer] medicamentos exactamente trista le fueron recetados. Las meglitinidas y las sulfonilureas pueden hacer que el azúcar en la miesha baje [place]. Llame a edwards médico si swati estar teniendo un problema con edwards medicamento. · Revísese los niveles de azúcar en la miesha con frecuencia. Puede usar un monitor de glucosa. Llevar un registro puede ayudarle a saber cómo ciertos alimentos, actividades y medicamentos afectan edwards azúcar en la miesha. Y puede ayudarle a prevenir que el azúcar en la miesha baje a niveles peligrosos. ¿Cuándo debe pedir ayuda?    Llame al 911 en cualquier momento que considere que necesita atención de urgencia. Por ejemplo, llame si:    · Se desmayó (perdió el conocimiento).     · Está confuso o no puede pensar con claridad.     · Edwards nivel de azúcar en la miesha es muy alto o [place]. Preste especial atención a los cambios en edwards mary y asegúrese de comunicarse con edwards médico si:    · Edwards nivel de azúcar en la miesha permanece fuera de los límites ideales que el médico wing establecido para usted.     · Tiene cualquier problema. ¿Dónde puede encontrar más información en inglés? Coralven Wheeler a [email or web address] e ingrese a edwards cuenta de MyChart. Cat Ill H153 en el cuadro \"Search Health Information\" para más información (en inglés) sobre \"Medicamentos no insulínicos para la diabetes de tipo 2: Instrucciones de cuidado. \"     Si no tiene nicole cuenta, shital alvaro en el enlace \"Sign Up Now\". Revisado: 31 agosto, 2020               Versión del contenido: 12.8  © 2006-2021 Healthwise, Incorporated. Las instrucciones de cuidado fueron adaptadas bajo licencia por [facility] ([facility]). Si usted tiene [place] afección médica o sobre estas instrucciones, siempre pregunte a edwards profesional de mary. Healthwise, Incorporated niega toda garantía o responsabilidad por edwards uso de esta información. Patient Education        Aprenda sobre la metformina para la diabetes tipo 2  Learning About Metformin for Type 2 Diabetes  Introducción     La metformina es un medicamento utilizado para tratar la diabetes tipo 2. Ayuda a mantener los niveles de azúcar en la miesha dentro de los límites deseados. Usted puede camron tratado de alimentarse en forma saludable, de adelgazar y de hacer más ejercicio para mantener el nivel de azúcar en edwards miesha dentro de paola límites ideales. Si esas cosas no ayudan, usted puede nery un medicamento que se llama metformina. Ayuda a edwards organismo a usar la insulina. [place] puede ayudarle a controlar el azúcar en la miesha. Puede tomarla corie o con otros medicamentos.   Cuando se rola corie, la metformina no debería causar niveles bajos de azúcar en la miesha ni aumento de [place]. Ejemplo  · Metformina (Glucophage)  Posibles efectos secundarios  Los efectos secundarios comunes incluyen:  · Náuseas a corto plazo. · Pérdida del apetito. · Diarrea. · Aumento de gases en el abdomen. · Sabor metálico.  Podría tener efectos secundarios o reacciones que no aparecen en esta lista. Revise la información que viene con edwards medicamento. Qué debe saber acerca de nery jessy medicamento  · La metformina no suele causar niveles bajos de azúcar en la miesha. [place] puede tener un nivel bajo de azúcar en la miesha cuando rola metformina y hace ejercicio intenso, nae alcohol o no ingiere suficiente alimento. · En ocasiones, la metformina se combina con otros medicamentos para la diabetes. Algunos de estos pueden causar niveles bajos de azúcar en la miesha. · Si necesita hacerse nicole prueba en la que se [place] un tinte o tiene que operarse, asegúrese de decirles a todos paola médicos que usted rola metformina. Es posible que tenga que dejar de tomarla antes y después de [address]. · Con el tiempo, los niveles sanguíneos de vitamina B12 pueden disminuir en algunas personas que basia metformina. Edwards organismo necesita esta vitamina B para producir células sanguíneas. También mantiene aziza el sistema nervioso. Si ha estado tomando metformina por más de [employer], pregúntele a edwards médico si necesita hacerse un análisis de miesha de vitamina B12 para medir la cantidad de vitamina B12 en la miesha. · Sea carole con los medicamentos. [place] paola medicamentos exactamente trista le fueron recetados. Llame a edwards médico si swati estar teniendo un problema con paola medicamentos. · Consulte con edwards médico o con el farmacéutico antes de nery cualquier otro medicamento, incluidos los de [place]. Asegúrese de que edwards médico sepa todos los medicamentos, vitaminas, productos herbarios y suplementos que usted está tomando. [place] juntos algunos medicamentos puede [employer]. ¿Dónde puede encontrar más información en inglés? Joseph Rack a [email or web address] e ingrese a fields cuenta de MyChart. Meche Marcio B390 en el [place] \"Search Health Information\" para más información (en inglés) sobre \"Aprenda sobre la metformina para la diabetes tipo 2. \"     Si no tiene nicole cuenta, shital alvaro en el enlace \"Sign Up Now\". Revisado: 31 agosto, 2020               Versión del contenido: 12.8  © 2006-2021 Healthwise, Incorporated. Las instrucciones de cuidado fueron adaptadas bajo licencia por [facility] Si usted tiene [place] afección médica o sobre estas instrucciones, siempre pregunte a fields profesional de mary. Healthwise, Incorporated niega toda garantía o responsabilidad por fields uso de esta información. Patient Education         Diabetes: Prueba de A1c (01:24)  Diabetes: A1c Test and Making a Plan  Fields profesional de la mary recomienda que sharron jessy breve video de Crunched. Sepa cómo nicole prueba de A1c [facility] [employer] niveles de azúcar en la miesha a lo casandra de un período de 3 meses. Cómo mirar el video    Escanee el código QR   o visite el sitio web    [email or web address]   Revisado: 31 agosto, 2020               Versión del contenido: 12.8  © 2006-2021 Healthwise, Incorporated. Las instrucciones de cuidado fueron adaptadas bajo licencia por [facility] Si usted tiene [place] afección médica o sobre estas instrucciones, siempre pregunte a fields profesional de mary. Healthwise, Incorporated niega toda garantía o responsabilidad por fields uso de esta información. Patient Education         Diabetes y ejercicio (02:42)  Diabetes and Exercise  Fields profesional de la mary recomienda que sharron jessy breve video de Crunched. Entienda cómo el ejercicio le ayuda a controlar el nivel de azúcar en la miesha y a sentirse mejor de otras [place].      [facility] video    Escanee el código QR   o visite el sitio web    [email or web address]   Revisado: 31 agosto, 2020               Versión del contenido: 12.8  © 2006-2021 Healthwise, Incorporated. Las instrucciones de cuidado fueron adaptadas bajo licencia por [facility] ([facility]). Si usted tiene [place] [employer] afección médica o sobre estas instrucciones, siempre pregunte a edwards profesional de mary. Healthwise, Incorporated niega toda garantía o responsabilidad por edwards uso de esta información.

## 2021-04-13 NOTE — PROGRESS NOTES
S: 38 y.o. female here as a new patient to establish care. Moved here 1 year ago from the [place]. DM2 for 2 years. Ran out of metformin 1 week ago. B/l lower leg paresthesias    Some abdominal pain. Drinks 2-3 soda weekly plus many carbs and sweets. Ovarian cyst 5 years ago. Dysmenorrhea with menses. Ibuprofen and tylenol helps. O: VS: /63 (Site: Right Upper Arm, Position: Sitting, Cuff Size: Medium Adult)   Pulse 89   Temp 97.2 °F (36.2 °C) (Axillary)   Resp 16   Ht 5' 4\" (1.626 m)   Wt 159 lb (72.1 kg)   LMP 03/15/2021   SpO2 99%   BMI 27.29 kg/m²    General: NAD   CV:  RRR, no gallops, rubs, or murmurs   Resp: CTAB no R/R/W   Abd:  Soft, nontender, no masses    Ext:  no C/C/E   Monofilament-nl  Impression/Plan:   1. DM2 with neuropathy-a1c today, 9.5. bg meter and supplies, check tid. Dm ed referral. Labs for dm and for neuropathy. Restart metformin and increase dose. 2. Abdominal pain with hx of ovarian cyst-u/s and rto for exam  3. HM-rto for pap  Obtain ror    rto in 2 weeks    Attending Physician Statement  I have discussed the case, including pertinent history and exam findings with the resident. I agree with the documented assessment and plan.         Beni Schmidt MD

## 2021-04-13 NOTE — PROGRESS NOTES
CC:  Establish care in [place]    HPI:  38 y.o. female with pmh of diabetes type II who presents to [facility]    Patient was previously living in the [facility] here a year ago , has been going to the ER for previous medical concerns  Stopped metformin a week ago ; ran out , since she didn't have a primary doctor, scheduled appointment for refill. DM II  - A1C: 9.5   - POCT glucose > 200  - diagnosed with DM 2 years ago  - Fhx DM : mother and brother  - Does not measure blood sugars at home; lost her glucometer  - Diet at home: eats a lot of salads, but loves rice , has been drinking coke and eating sweets everyday   - drinks 1 can of coke atleast twice a week, has sugary juices with meals  - Complains of numbness and tingling in lower extremities ; for approximately 2 years   Has never seen a doctor for this  - Has not seen ophthalmologist or podiatry    Ovarian Cysts  - was diagnosed with them 5 years  - has abdominal pain associated with menstruation  - regular, every month ; 5 days ; normal bleeding with menstrual cramps  - takes ibuprofen and APAP as needed      Patient Active Problem List    Diagnosis Date Noted    Ovarian cyst 04/13/2021    Uncontrolled type 2 diabetes mellitus with hyperglycemia ([facility] [address] 04/13/2021       Past Medical History:   Diagnosis Date    [facility] ([facility]     Ovarian cyst        Current Outpatient Medications on File Prior to Visit   Medication Sig Dispense Refill    ibuprofen (ADVIL;MOTRIN) 200 MG tablet Take 200 mg by mouth every 6 hours as needed for Pain      APAP-Pamabrom-Pyrilamine 500-25-15 MG TABS Take 1-2 tablets by mouth every 6 hours as needed (menstrual cramps) Max: 8 tablets in 24 hours. 30 tablet 0     No current facility-administered medications on file prior to visit. No Known Allergies    Family History   Problem Relation Age of Onset    Diabetes Mother     Diabetes Brother        No past surgical history on file.     Social History behavior is normal. Judgment and thought content normal.     Assessment:    1. Uncontrolled type 2 diabetes mellitus with hyperglycemia ([facility] [address]  - Patient POCT HBA1C approximately 9.5, POCT glucose > 200  - patient was previously on metformin 500 mg BID, stopped taking a week ago as her prescription ran out , no longer able to get refills  - lost glucometer at home, unable to take sugar readings  - Metformin dose was previously 500 mg BID. This is not optimal considering A1C of 9.5, with patient having just stopped metformin a week ago. Dosage increased to 1000 mg BID. Patient advised about side effects, will continue to monitor and follow up in 2 weeks  - blood work ordered this visit including : CBC, CMP, TSH, vitamin B and Folate levels, micro albumin/creatinine ratio  - glucose monitoring kit ordered as well as patient supplied with glucose log ; advised to record glucose readings TID for the next 2 weeks prior to next visit  - [facility] [employer] referral provided  - Diabetic Foot Exam    2. History of ovarian cyst  - patient states she has history of ovarian cysts 5 years ago  - increased pain with menstrual cycles  - continue Advil and Apap  - pelvic ultrasound ordered in order to develop baseline here for ovarian cyst history ; laterality unknown    3. Screening for HIV (human immunodeficiency virus)  - HIV screen performed today    4. Encounter for hepatitis C screening test for low risk patient  - hepatitis C screen performed today     RTO 2 weeks or sooner prn for any persistent, new, or worsening symptoms. Please see Patient Instructions for further counseling and information given. Advised to please be adherent to the treatment plans discussed today, and please call with any questions or concerns, letting the office know of any reasons that the plans may not be followed. The risks of untreated conditions include worsening illness, injury, disability, and possibly, death. Please call if symptoms change in any way, worsen, or fail to completely resolve, as this could necessitate a change to treatment plans. Patient and/or caregiver expressed understanding. Indications and proper use of medication(s) reviewed. Potential side-effects and risks of medication(s) also explained. Patient and/or caregiver was instructed to call if any new symptoms develop prior to next visit. Health risk factors discussed and addressed.      Electronically signed by Simran Griffin @  PGY-1 on 4/13/2021 at 5:16 PM  This case was discussed with attending physician: Dr. Raymundo Williamson

## 2021-04-21 ENCOUNTER — TELEPHONE (OUTPATIENT)
Dept: FAMILY MEDICINE CLINIC | Age: 36
End: 2021-04-21

## 2021-04-21 NOTE — TELEPHONE ENCOUNTER
Pharmacy called and stated that they need to know how many times a day does the patient need to test her sugars.   Thanks, Ene HERNÁNDEZ

## 2021-04-26 ENCOUNTER — HOSPITAL ENCOUNTER (OUTPATIENT)
Dept: DIABETES SERVICES | Age: 36
Setting detail: THERAPIES SERIES
Discharge: HOME | End: 2021-04-26
Payer: COMMERCIAL

## 2021-04-26 PROCEDURE — G0108 DIAB MANAGE TRN  PER INDIV: HCPCS

## 2021-04-26 ASSESSMENT — PROBLEM AREAS IN DIABETES QUESTIONNAIRE (PAID)
FEELING THAT DIABETES IS TAKING UP TOO MUCH OF YOUR MENTAL AND PHYSICAL ENERGY EVERY DAY: 2
FEELING SCARED WHEN YOU THINK ABOUT LIVING WITH DIABETES: 3
PAID-5 TOTAL SCORE: 8
FEELING DEPRESSED WHEN YOU THINK ABOUT LIVING WITH DIABETES: 3
COPING WITH COMPLICATIONS OF DIABETES: 0

## 2021-04-26 NOTE — LETTER
[facility]- Diabetes Education Department Initial Diabetes Education Letter    2021       Re:     Gabriella Marques         :  1985  Dear Dr. Lynda Connell:                    Thank you for referring your patient, Gabriella Marques, for diabetes education. Gabriella Marques has completed her comprehensive education plan today which included the topics below:    Nursing/Medical [x]      Nutrition [x]  [] Diabetes disease process & treatment   [] Diabetes medication use and safety   [] Self-monitoring blood glucose/interpreting results  [] Prevention, detection and treatment of acute complications  [] Prevention, detection and treatment of chronic complications  [] Developing strategies to address psychosocial issues    [] Nutritional management: basic principles   [] Carbohydrate counting, plate method, label reading  [] Benefits of/ways to incorporate physical activity  [] Problem solving and preparing for crisis situations  [] Diabetes supply management  [] Goal setting and behavior modification         PAID -5 (Problem Areas in Diabetes) Survey Results  8  A total score of 8 or greater indicates possible diabetes related emotional distress which warrants further assessment.  [] [address] and Crisis Resource information provided. Comments:     PATIENT SELECTED GOAL:   [x]  I will follow my meal plan and measure my carbohydrate foods. [x]  I will increase my activity to 30 minutes 5 days per week. []  I will check my blood glucose as ordered by my doctor. []  I will take my medications at the correct times as ordered by my doctor.   []  Other:      DIABETES SELF-MANAGEMENT SUPPORT PLAN/REFERRALS (patient identified):  [x] Keep my scheduled visits with my doctor   [x] Make and keep appointments with specialists (foot, eye, dentist) as recommended  [] Consult my pharmacist with all new medications and/or any medication questions  [] Get tested for sleep apnea  [] Seek help for:   [] Make an

## 2021-04-26 NOTE — PROGRESS NOTES
Diabetes Self-Management Education Record    Participant Name: Richard Monge  Referring Provider: Jerod Hartley MD  Assessment/Evaluation Ratings:  1=Needs Instruction   4=Demonstrates Understanding/Competency  2=Needs Review   NC=Not Covered    3=Comprehends Key Points  N/A=Not Applicable  Topics/Learning Objectives Pre-session Assess Date:  Instructor initials/date  [facility] 4/26/21 Instr. Date    Instructor initials/date  [facility] 4/26/21 Follow-up Post- session Eval Comments   Diabetes disease process & Treatment process:   -Define type of diabetes in simple terms.  - Describe the ABCs of  diabetes management  -Identify own type of diabetes  -Identify lifestyle changes/treatment options  -other:  1 [x] All     []  []  []  []  []  3 KMS 4/26/21  Type 2 DM for about 3 years    Developing strategies for Healthy coping/psychosocial issues:    -Describe feelings about living with diabetes  -Identify coping strategies and sources of stress  -Identify support needed & support network available  -Complete PAID-5 Diabetes questionnaire 1 [x] All     []  []  []    []  3 Elsa Hayes Lida completed a Diabetes Self- Management Education Assessment on 4/26/21. Part of our assessment is having the patient complete the PAID (Problem Areas in Diabetes Scale)-5 survey. This tool  measures diabetes-related emotional distress a patient may be feeling. Richard Monge scored: 8   A total score of >8 indicates possible diabetes related emotional distress, which warrants further assessment and a referral to mental health professional for psychological support and treatment.            Prevention, detection & treatment of Chronic complications:    -Identify the prevention, detection and treatment for complications including immunizations, preventive eye, foot, dental and renal exams as indicated per the participant's duration of diabetes and health status.  -Define the natural course of diabetes and the relationship of blood glucose levels to long for education. PCP notified via EMR.          Date:   Follow-up goal attainment based on patients initial DSMES goal    Dr Notified by [] EMR []Fax        []Post class Hgb A1C  []Medication compliance   []Plate method/meal plan compliance   []Able to state the number of Carbohydrate servings eaten at B,L,D   []Testing blood glucose as prescribed by PCP   []Exercise Routine   []Other:   []Other:     []Patient lost to follow-up  Dr Notified by []EMR []Fax     Personal Support Plan:      [x] Keep all scheduled doctor appointments   [x] Make and keep appointments with specialists (foot, eye, dentist) as recommended   [] Consult my pharmacist about all new medications or to ask any medication questions   [] Get tested for sleep apnea   [] Seek help for:   [] Make an appointment with:   [] Attend smoking cessation classes or call 1-800-QUIT-NOW  [] Attend Diabetes Support Group   [] Use diabetes magazines, books, or credible web-sites like the ADA for more information  [x] Increase exercise at home or join an exercise program:   [] Other:

## 2021-04-26 NOTE — PROGRESS NOTES
Diabetes Self-Management Education Record    Participant Name: Pauline Goode  Referring Provider: Panda Mccall MD  Assessment/Evaluation Ratings:  1=Needs Instruction   4=Demonstrates Understanding/Competency  2=Needs Review   NC=Not Covered    3=Comprehends Key Points  N/A=Not Applicable  Topics/Learning Objectives Pre-session Assess Date:  Instructor initials/date  [facility] 4/26/21 Instr. Date    Instructor initials/date  [facility] [place] Follow-up Post- session Eval Comments   Diabetes disease process & Treatment process:   -Define type of diabetes in simple terms.  - Describe the ABCs of  diabetes management  -Identify own type of diabetes  -Identify lifestyle changes/treatment options  -other:  1 [x] All     []  []  []  []  []  3 KMS 4/26/21  Type 2 DM for about 3 years    Developing strategies for Healthy coping/psychosocial issues:    -Describe feelings about living with diabetes  -Identify coping strategies and sources of stress  -Identify support needed & support network available  -Complete PAID-5 Diabetes questionnaire 1 [x] All     []  []  []    []  3 Elsa Guallpa completed a Diabetes Self- Management Education Assessment on 4/26/21. Part of our assessment is having the patient complete the PAID (Problem Areas in Diabetes Scale)-5 survey. This tool  measures diabetes-related emotional distress a patient may be feeling. Pauline Goode scored: 8   A total score of >8 indicates possible diabetes related emotional distress, which warrants further assessment and a referral to mental health professional for psychological support and treatment.            Prevention, detection & treatment of Chronic complications:    -Identify the prevention, detection and treatment for complications including immunizations, preventive eye, foot, dental and renal exams as indicated per the participant's duration of diabetes and health status.  -Define the natural course of diabetes and the relationship of blood glucose levels to long term complications of diabetes. 1 [x] All     []            []  3 KMS 4/26/21  Patient has neuropathy in legs, blurred vision (unsure if d/t hyperglycemia--understands she needs to make an appointment with ophthalmologist). Prevention, detection & treatment of acute complications:    -State the causes,signs & symptoms of hyper & hypoglycemia, and prevention & treatment strategies.   -Describe sick day guidelines  DKA /indications for ketone testing &  when to call physician  1 [x] All     []      []    4            -Identify severe weather/situation crisis  & diabetes supplies management  []      Using medications safely:   -State effects of diabetes medicines on blood glucose levels;  -List diabetes medication taken, action & side effects 1 [x] All     []  []  4 KMS 4/26/21  Metformin 1000 mg BID   Insulin/Injectables/glucagon  -Name appropriate injection sites; proper storage; supplies needed;  N/A   []       Demonstrate proper technique  []      Monitoring blood glucose, interpreting and using results:   -Identify the purpose of testing   -Identify recommended & personal blood glucose targets & HgbA1C target levels  -State the Importance of logging blood glucose levels for pattern recognition;   -State benefits of reading/using pt generated health data  -Verbalize safe lancet disposal 1 [x] All     []  []    []  []  []  4 KMS 4/26/21  Not currently monitoring at home--states the pharmacy did not have her glucose meter and supplies ready. I called to verify with [address] that they are ready now, and they are. Cost for lancets will be around $10, but everything else was covered by insurance. Patient has self-monitored in the past and states she has no troubles.      A1C 9.5%   -Demonstrate proper testing technique  []      Incorporating physical activity into lifestyle:   -State effect of exercise on blood glucose levels;   -State benefits of regular exercise;   -Define safety considerations/food choices if needed.  -Describe contraindications/maintenance of activity. 1 [x] All     []  []    []  []  4 KMS 4/26/21  Patient walks when the weather is nice, but has not been exercising lately. She will try to exercise indoors in her basement when the weather is not nice. Goal is at least 30 minutes of aerobic activity 5 days per week. Incorporating nutritional management into lifestyle:   -Describe effect of type, amount & timing of food on blood glucose  -Describe methods for preparing and planning healthy meals  -Correctly read food labels  -Name 3 foods high in Carbohydrate 1 [x] All       []    []    []  []  4 4/26/21 CS Instructed patient on carbohydrate containing food, serving size and timing of meals and snack. Diabetes Plate method modeled using food models. Pt able to state carbohydrate containing foods. Pt able to demonstrate label reading for carbohydrate content.    -Plan a carbohydrate-controlled meal based on individualized meal plan  -Demonstrate CHO counting/portion control   []  []      Developing strategies for problem solving to promote health/change behavior. -Identify 7 self-care behaviors; Personal health risk factors; Benefits, challenges & strategies for behavioral change and set an individualized goal selection. 1 [x]  4 KMS 4/26/21  [x]Nutrition: I will eat consistent carbohydrates at meals. []Monitoring  [x]Exercise: I will start exercising with a goal of 30 minutes 5 days per week.    []Medication  []Other     Identified Barriers to learning/adherence to self management plan:    Visual and Language  []  other    Instruction Method:  Lecture/Discussion and Handouts    Supporting Education Materials/Equipment Provided: Self-management manual and Nutritional Packet   [x]Georgian materials       [x] services via iPad     []Other:      Encounter Type Date Attended Start Time End Time Comments No Show Dates   Assessment          Session 1         Session 2        1:1 JENNYFER JAMES/GABRIELLE 4/26/21 [id number]  in person    In person Follow-up         Gestational Diabetes         Individual MNT        Meter Instrx        Insulin Instrx           Additional Comments: [x] Pt seen individually due to language barrier and need for .  present for education. PCP notified via EMR.          Date:   Follow-up goal attainment based on patients initial DSMES goal    Dr Notified by [] EMR []Fax        []Post class Hgb A1C  []Medication compliance   []Plate method/meal plan compliance   []Able to state the number of Carbohydrate servings eaten at B,L,D   []Testing blood glucose as prescribed by PCP   []Exercise Routine   []Other:   []Other:     []Patient lost to follow-up  Dr Notified by []EMR []Fax     Personal Support Plan:      [x] Keep all scheduled doctor appointments   [x] Make and keep appointments with specialists (foot, eye, dentist) as recommended   [] Consult my pharmacist about all new medications or to ask any medication questions   [] Get tested for sleep apnea   [] Seek help for:   [] Make an appointment with:   [] Attend smoking cessation classes or call 1-800-QUIT-NOW  [] Attend Diabetes Support Group   [] Use diabetes magazines, books, or credible web-sites like the ADA for more information  [x] Increase exercise at home or join an exercise program:   [] Other:

## 2021-04-27 ENCOUNTER — IMMUNIZATION (OUTPATIENT)
Dept: PRIMARY CARE CLINIC | Age: 36
End: 2021-04-27
Payer: COMMERCIAL

## 2021-04-27 PROCEDURE — 0001A COVID-19, PFIZER VACCINE 30MCG/0.3ML DOSE: CPT | Performed by: NURSE PRACTITIONER

## 2021-04-27 PROCEDURE — 91300 COVID-19, PFIZER VACCINE 30MCG/0.3ML DOSE: CPT | Performed by: NURSE PRACTITIONER

## 2021-06-01 ENCOUNTER — IMMUNIZATION (OUTPATIENT)
Dept: PRIMARY CARE CLINIC | Age: 36
End: 2021-06-01
Payer: COMMERCIAL

## 2021-06-01 PROCEDURE — 0002A COVID-19, PFIZER VACCINE 30MCG/0.3ML DOSE: CPT | Performed by: NURSE PRACTITIONER

## 2021-06-01 PROCEDURE — 91300 COVID-19, PFIZER VACCINE 30MCG/0.3ML DOSE: CPT | Performed by: NURSE PRACTITIONER

## 2021-08-03 ENCOUNTER — FOLLOWUP TELEPHONE ENCOUNTER (OUTPATIENT)
Dept: DIABETES SERVICES | Age: 36
End: 2021-08-03

## 2021-08-03 NOTE — LETTER
[facility] Diabetes Education DSMES Follow-up Letter    Name: Paola Atkinson  :   1985    Follow-up plan/Date:   2021               Stefanie Dubon     Dear Dr. Paulina Arana:    Thank you for referring  Paola Atkinson  to [facility]. Paola Atkinson  has completed their personalized comprehensive education plan. The education plan included the following topics: Diabetes Disease Process, Nutrition, Exercise, Glucose Monitoring, Acute and Chronic Complication, Behavioral and Lifestyle Change, Healthy Coping and goal setting. We contact participants 3 months after attending our services to review their progress on their chosen goal.      The following area was chosen: [x] Healthy Eating   [x] Being Active  [] Monitoring [] Problem Solving [] Taking Medication [] Healthy Coping  []Reducing Risks    Selected goal outcome Post Education:     Patient states they met their goals at least 75% of time. Thank you for referring this patient to our program. Please do not hesitate to call if we can be of any service for this patient.     Maggie Mendosa [facility]: [address]: [facility] Diabetes Education Department  American Diabetes Association Recognized [facility] Program

## 2021-08-03 NOTE — PROGRESS NOTES
Diabetes Self-Management Education Record    Participant Name: Donnie Light  Referring Provider: Mk Laughlin MD  Assessment/Evaluation Ratings:  1=Needs Instruction   4=Demonstrates Understanding/Competency  2=Needs Review   NC=Not Covered    3=Comprehends Key Points  N/A=Not Applicable  Topics/Learning Objectives Pre-session Assess Date:  Instructor initials/date  [facility] 4/26/21 Instr. Date    Instructor initials/date  [facility] 4/26/21 Follow-up Post- session Eval Comments   Diabetes disease process & Treatment process:   -Define type of diabetes in simple terms.  - Describe the ABCs of  diabetes management  -Identify own type of diabetes  -Identify lifestyle changes/treatment options  -other:  1 [x] All     []  []  []  []  []  3 KMS 4/26/21  Type 2 DM for about 3 years    Developing strategies for Healthy coping/psychosocial issues:    -Describe feelings about living with diabetes  -Identify coping strategies and sources of stress  -Identify support needed & support network available  -Complete PAID-5 Diabetes questionnaire 1 [x] All     []  []  []    []  3 Elsa Yosi Garcia completed a Diabetes Self- Management Education Assessment on 4/26/21. Part of our assessment is having the patient complete the PAID (Problem Areas in Diabetes Scale)-5 survey. This tool  measures diabetes-related emotional distress a patient may be feeling. Donnie Light scored: 8   A total score of >8 indicates possible diabetes related emotional distress, which warrants further assessment and a referral to mental health professional for psychological support and treatment.            Prevention, detection & treatment of Chronic complications:    -Identify the prevention, detection and treatment for complications including immunizations, preventive eye, foot, dental and renal exams as indicated per the participant's duration of diabetes and health status.  -Define the natural course of diabetes and the relationship of blood glucose levels to long term complications of diabetes. 1 [x] All     []            []  3 KMS 4/26/21  Patient has neuropathy in legs, blurred vision (unsure if d/t hyperglycemia--understands she needs to make an appointment with ophthalmologist). Prevention, detection & treatment of acute complications:    -State the causes,signs & symptoms of hyper & hypoglycemia, and prevention & treatment strategies.   -Describe sick day guidelines  DKA /indications for ketone testing &  when to call physician  1 [x] All     []      []    4            -Identify severe weather/situation crisis  & diabetes supplies management  []      Using medications safely:   -State effects of diabetes medicines on blood glucose levels;  -List diabetes medication taken, action & side effects 1 [x] All     []  []  4 KMS 4/26/21  Metformin 1000 mg BID   Insulin/Injectables/glucagon  -Name appropriate injection sites; proper storage; supplies needed;  N/A   []       Demonstrate proper technique  []      Monitoring blood glucose, interpreting and using results:   -Identify the purpose of testing   -Identify recommended & personal blood glucose targets & HgbA1C target levels  -State the Importance of logging blood glucose levels for pattern recognition;   -State benefits of reading/using pt generated health data  -Verbalize safe lancet disposal 1 [x] All     []  []    []  []  []  4 KMS 4/26/21  Not currently monitoring at home--states the pharmacy did not have her glucose meter and supplies ready. I called to verify with [address] that they are ready now, and they are. Cost for lancets will be around $10, but everything else was covered by insurance. Patient has self-monitored in the past and states she has no troubles.      A1C 9.5%   -Demonstrate proper testing technique  []      Incorporating physical activity into lifestyle:   -State effect of exercise on blood glucose levels;   -State benefits of regular exercise;   -Define safety considerations/food choices if needed.  -Describe contraindications/maintenance of activity. 1 [x] All     []  []    []  []  4 KMS 4/26/21  Patient walks when the weather is nice, but has not been exercising lately. She will try to exercise indoors in her basement when the weather is not nice. Goal is at least 30 minutes of aerobic activity 5 days per week. Incorporating nutritional management into lifestyle:   -Describe effect of type, amount & timing of food on blood glucose  -Describe methods for preparing and planning healthy meals  -Correctly read food labels  -Name 3 foods high in Carbohydrate 1 [x] All       []    []    []  []  4 4/26/21 CS Instructed patient on carbohydrate containing food, serving size and timing of meals and snack. Diabetes Plate method modeled using food models. Pt able to state carbohydrate containing foods. Pt able to demonstrate label reading for carbohydrate content.    -Plan a carbohydrate-controlled meal based on individualized meal plan  -Demonstrate CHO counting/portion control   []  []      Developing strategies for problem solving to promote health/change behavior. -Identify 7 self-care behaviors; Personal health risk factors; Benefits, challenges & strategies for behavioral change and set an individualized goal selection. 1 [x]  4 KMS 4/26/21  [x]Nutrition: I will eat consistent carbohydrates at meals. []Monitoring  [x]Exercise: I will start exercising with a goal of 30 minutes 5 days per week.    []Medication  []Other     Identified Barriers to learning/adherence to self management plan:    Visual and Language  []  other    Instruction Method:  Lecture/Discussion and Handouts    Supporting Education Materials/Equipment Provided: Self-management manual and Nutritional Packet   [x]Persian materials       [x] services via iPad     []Other:      Encounter Type Date Attended Start Time End Time Comments No Show Dates   Assessment          Session 1         Session 2        1:1 JENNYFER KMS/CS 4/26/21 1415 1545  in person    In person Follow-up         Gestational Diabetes         Individual MNT        Meter Instrx        Insulin Instrx           Additional Comments: [x] Pt seen individually due to language barrier and need for .  present for education. PCP notified via EMR. Date: 7/30/21   Follow-up goal attainment based on patients initial DSMES goal: nutrition and exercise goal met at least 75% of the time.     Notified by [x] EMR []Fax        []Post class Hgb A1C  []Medication compliance   [x]Plate method/meal plan compliance   []Able to state the number of Carbohydrate servings eaten at B,L,D   []Testing blood glucose as prescribed by PCP   [x]Exercise Routine   []Other:   []Other:     []Patient lost to follow-up   Notified by []EMR []Fax     Personal Support Plan:      [x] Keep all scheduled doctor appointments   [x] Make and keep appointments with specialists (foot, eye, dentist) as recommended   [] Consult my pharmacist about all new medications or to ask any medication questions   [] Get tested for sleep apnea   [] Seek help for:   [] Make an appointment with:   [] Attend smoking cessation classes or call 1-800-QUIT-NOW  [] Attend Diabetes Support Group   [] Use diabetes magazines, books, or credible web-sites like the ADA for more information  [x] Increase exercise at home or join an exercise program:   [] Other:

## 2021-08-05 ENCOUNTER — OFFICE VISIT (OUTPATIENT)
Dept: FAMILY MEDICINE CLINIC | Age: 36
End: 2021-08-05
Payer: COMMERCIAL

## 2021-08-05 VITALS
TEMPERATURE: 97.7 F | SYSTOLIC BLOOD PRESSURE: 104 MMHG | DIASTOLIC BLOOD PRESSURE: 66 MMHG | OXYGEN SATURATION: 98 % | HEIGHT: 64 IN | WEIGHT: 156 LBS | HEART RATE: 81 BPM | BODY MASS INDEX: 26.63 KG/M2

## 2021-08-05 DIAGNOSIS — E11.65 UNCONTROLLED TYPE 2 DIABETES MELLITUS WITH HYPERGLYCEMIA (HCC): Primary | ICD-10-CM

## 2021-08-05 DIAGNOSIS — M79.601 PAIN OF RIGHT UPPER EXTREMITY: ICD-10-CM

## 2021-08-05 PROCEDURE — 99212 OFFICE O/P EST SF 10 MIN: CPT | Performed by: FAMILY MEDICINE

## 2021-08-05 PROCEDURE — 2022F DILAT RTA XM EVC RTNOPTHY: CPT | Performed by: FAMILY MEDICINE

## 2021-08-05 PROCEDURE — G8419 CALC BMI OUT NRM PARAM NOF/U: HCPCS | Performed by: FAMILY MEDICINE

## 2021-08-05 PROCEDURE — 3046F HEMOGLOBIN A1C LEVEL >9.0%: CPT | Performed by: FAMILY MEDICINE

## 2021-08-05 PROCEDURE — 1036F TOBACCO NON-USER: CPT | Performed by: FAMILY MEDICINE

## 2021-08-05 PROCEDURE — G8427 DOCREV CUR MEDS BY ELIG CLIN: HCPCS | Performed by: FAMILY MEDICINE

## 2021-08-05 PROCEDURE — 99213 OFFICE O/P EST LOW 20 MIN: CPT | Performed by: FAMILY MEDICINE

## 2021-08-05 RX ORDER — NAPROXEN 500 MG/1
500 TABLET ORAL 2 TIMES DAILY WITH MEALS
Qty: 60 TABLET | Refills: 3 | Status: SHIPPED
Start: 2021-08-05 | End: 2022-02-01 | Stop reason: SDUPTHER

## 2021-08-05 RX ORDER — IBUPROFEN 200 MG
200 TABLET ORAL EVERY 6 HOURS PRN
Qty: 120 TABLET | Status: CANCELLED | OUTPATIENT
Start: 2021-08-05

## 2021-08-05 SDOH — ECONOMIC STABILITY: FOOD INSECURITY: WITHIN THE PAST 12 MONTHS, THE FOOD YOU BOUGHT JUST DIDN'T LAST AND YOU DIDN'T HAVE MONEY TO GET MORE.: NEVER TRUE

## 2021-08-05 SDOH — ECONOMIC STABILITY: FOOD INSECURITY: WITHIN THE PAST 12 MONTHS, YOU WORRIED THAT YOUR FOOD WOULD RUN OUT BEFORE YOU GOT MONEY TO BUY MORE.: NEVER TRUE

## 2021-08-05 ASSESSMENT — SOCIAL DETERMINANTS OF HEALTH (SDOH): HOW HARD IS IT FOR YOU TO PAY FOR THE VERY BASICS LIKE FOOD, HOUSING, MEDICAL CARE, AND HEATING?: NOT HARD AT ALL

## 2021-08-05 NOTE — PROGRESS NOTES
CC:  Diabetes    HPI:  38 y.o. female with pmh of DM II and hx of ovarian cyst presents for DM II follow up and new complaint of right arm pain. 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 Patient Active Problem List    Diagnosis Date Noted    Ovarian cyst 04/13/2021    Uncontrolled type 2 diabetes mellitus with hyperglycemia ([facility] [address] 04/13/2021       Past Medical History:   Diagnosis Date    [facility]     Ovarian cyst        Current Outpatient Medications on File Prior to Visit   Medication Sig Dispense Refill    ibuprofen (ADVIL;MOTRIN) 200 MG tablet Take 200 mg by mouth every 6 hours as needed for Pain      blood glucose monitor kit and supplies Dispense sufficient amount for indicated testing frequency plus additional to accommodate PRN testing needs. Dispense all needed supplies to include: monitor, strips, lancing device, lancets, control solutions, alcohol swabs. 1 kit 0    metFORMIN (GLUCOPHAGE) 1000 MG tablet Take 1 tablet by mouth 2 times daily (with meals) 180 tablet 1    APAP-Pamabrom-Pyrilamine 500-25-15 MG TABS Take 1-2 tablets by mouth every 6 hours as needed (menstrual cramps) Max: 8 tablets in 24 hours.  (Patient not taking: Reported on 8/5/2021) 30 tablet 0     No current facility-administered medications on file prior to visit. No Known Allergies    Family History   Problem Relation Age of Onset    Diabetes Mother     Diabetes Brother        No past surgical history on file. Social History     Tobacco Use    Smoking status: Never Smoker    Smokeless tobacco: Never Used   Vaping Use    Vaping Use: Never used   Substance Use Topics    Alcohol use: Yes     Comment: social    Drug use: Never       ROS:    Review of Systems   Constitutional: Negative for activity change and appetite change. HENT: Negative for ear discharge and ear pain. Eyes: Negative for redness and itching. Respiratory: Negative for chest tightness and shortness of breath. Cardiovascular: Negative for chest pain and palpitations. Gastrointestinal: Negative for abdominal pain, constipation and diarrhea. Genitourinary: Negative for difficulty urinating and dyspareunia. Musculoskeletal: Positive for arthralgias and myalgias. Skin: Negative for pallor and rash. Neurological: Negative for dizziness and weakness. Psychiatric/Behavioral: Negative for sleep disturbance. The patient is not nervous/anxious. Objective:    VS:  Blood pressure 104/66, pulse 81, temperature 97.7 °F (36.5 °C), temperature source Temporal, height 5' 4\" (1.626 m), weight 156 lb (70.8 kg), SpO2 98 %. Physical Exam  Constitutional:       General: She is not in acute distress. Appearance: She is not ill-appearing. HENT:      Head: Normocephalic and atraumatic. Cardiovascular:      Rate and Rhythm: Normal rate and regular rhythm. Pulses: Normal pulses. Heart sounds: Normal heart sounds. No murmur heard. No friction rub. No gallop. Pulmonary:      Effort: Pulmonary effort is normal. No respiratory distress. Breath sounds: Normal breath sounds. No stridor. No wheezing or rhonchi. Abdominal:      General: Bowel sounds are normal. There is no distension. Palpations: Abdomen is soft. Tenderness:  There is no abdominal tenderness. Musculoskeletal:         General: Tenderness present. No swelling or deformity. Normal range of motion. Comments: Normal strength bilateral upper and lower extremities  Right upper extremity : normal supination, pronation , flexion and extension  TTP over dorsal aspect of right forearm   Skin:     General: Skin is warm and dry. Capillary Refill: Capillary refill takes less than 2 seconds. Coloration: Skin is not pale. Findings: No bruising, erythema or lesion. Comments: No bruising or lesions seen over right forearm   Neurological:      General: No focal deficit present. Mental Status: She is alert and oriented to person, place, and time. Cranial Nerves: No cranial nerve deficit. Sensory: No sensory deficit. Motor: No weakness. Coordination: Coordination normal.   Psychiatric:         Mood and Affect: Mood normal.         Behavior: Behavior normal.         Thought Content: Thought content normal.         Judgment: Judgment normal.       Assessment/ Plan:    1. Uncontrolled type 2 diabetes mellitus with hyperglycemia (HCC)  - continue metformin 1000 mg BID  - repeat hemoglobin A1c today    2. Pain of right upper extremity  - naprosyn 500 mg PRN for pain    RTO 1 month  or sooner prn for any persistent, new, or worsening symptoms. Please see Patient Instructions for further counseling and information given. Advised to please be adherent to the treatment plans discussed today, and please call with any questions or concerns, letting the office know of any reasons that the plans may not be followed. The risks of untreated conditions include worsening illness, injury, disability, and possibly, death. Please call if symptoms change in any way, worsen, or fail to completely resolve, as this could necessitate a change to treatment plans. Patient and/or caregiver expressed understanding. Indications and proper use of medication(s) reviewed. Potential side-effects and risks of medication(s) also explained. Patient and/or caregiver was instructed to call if any new symptoms develop prior to next visit. Health risk factors discussed and addressed.      Electronically signed by Lulu Martinez MD  PGY-1 on 8/5/2021 at 11:29 AM  This case was discussed with attending physician: Dr. Omid Haas

## 2021-08-05 NOTE — PROGRESS NOTES
This is Ms. You Byrne, she is a 47-year-old female with past medical history of diabetes type 2, last hemoglobin A1c April 2021 was 9.5 who presents for follow-up of diabetes , and for arm pain. DM type II  Hemoglobin A1c 9.5 in April  At the time patient was given diabetic education and asked to follow-up appropriately  Was started on Metformin 1000 twice daily due to complaints of numbness and tingling in bilateral lower extremities  Patient states that diabetic education was very helpful and that she learned a lot  Patient has been taking Metformin as advised  Has been noncompliant with measuring sugars at home; states that she does not like to poke herself and has not checked in the past month  States she has been working on controlling her diet, does not report any hypoglycemic episodes, states that she eats chocolate once in a while only. 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 States she may have hurt her arm 1 to 2 weeks ago  Has been taking ibuprofen maybe once daily and states is minimally helping  Does not recall dose of ibuprofen    No other concerns at this time    Blood pressure 104/66, pulse 81, temperature 97.7 °F (36.5 °C), temperature source Temporal, height 5' 4\" (1.626 m), weight 156 lb (70.8 kg), SpO2 98 %. HEENT WNL     Heart regular    Lungs clear    abd non-tender   MSK: TTP over pronator teres. No difficulty in pronation supination, flexion or extension. 5 out of 5 bilateral upper extremities. Full sensation bilateral upper extremities   no edema    Pulses intact     A/P     DM type II  -POCT hemoglobin A1c today  -Continue Metformin 1000 mg twice daily , due for microalbumin creatinine ratio today  -Naproxen 500 milligrams for pain as needed      Attending Physician Statement  I have discussed the case, including pertinent history and exam findings with the resident.  I agree with the documented assessment and plan.

## 2021-08-06 ASSESSMENT — ENCOUNTER SYMPTOMS
CHEST TIGHTNESS: 0
SHORTNESS OF BREATH: 0
EYE REDNESS: 0
CONSTIPATION: 0
EYE ITCHING: 0
DIARRHEA: 0
ABDOMINAL PAIN: 0

## 2022-01-18 ENCOUNTER — OFFICE VISIT (OUTPATIENT)
Dept: FAMILY MEDICINE CLINIC | Age: 37
End: 2022-01-18
Payer: COMMERCIAL

## 2022-01-18 VITALS
OXYGEN SATURATION: 98 % | TEMPERATURE: 97.7 F | HEART RATE: 108 BPM | DIASTOLIC BLOOD PRESSURE: 80 MMHG | RESPIRATION RATE: 16 BRPM | SYSTOLIC BLOOD PRESSURE: 128 MMHG

## 2022-01-18 DIAGNOSIS — R05.9 COUGH WITH FEVER: Primary | ICD-10-CM

## 2022-01-18 DIAGNOSIS — R50.9 COUGH WITH FEVER: ICD-10-CM

## 2022-01-18 DIAGNOSIS — R05.9 COUGH WITH FEVER: ICD-10-CM

## 2022-01-18 DIAGNOSIS — R50.9 COUGH WITH FEVER: Primary | ICD-10-CM

## 2022-01-18 LAB
INFLUENZA A ANTIGEN, POC: NEGATIVE
INFLUENZA B ANTIGEN, POC: NEGATIVE

## 2022-01-18 PROCEDURE — G8419 CALC BMI OUT NRM PARAM NOF/U: HCPCS | Performed by: FAMILY MEDICINE

## 2022-01-18 PROCEDURE — G8427 DOCREV CUR MEDS BY ELIG CLIN: HCPCS | Performed by: FAMILY MEDICINE

## 2022-01-18 PROCEDURE — 99213 OFFICE O/P EST LOW 20 MIN: CPT | Performed by: FAMILY MEDICINE

## 2022-01-18 PROCEDURE — G8484 FLU IMMUNIZE NO ADMIN: HCPCS | Performed by: FAMILY MEDICINE

## 2022-01-18 PROCEDURE — 1036F TOBACCO NON-USER: CPT | Performed by: FAMILY MEDICINE

## 2022-01-18 PROCEDURE — 99212 OFFICE O/P EST SF 10 MIN: CPT | Performed by: FAMILY MEDICINE

## 2022-01-18 PROCEDURE — 87804 INFLUENZA ASSAY W/OPTIC: CPT | Performed by: FAMILY MEDICINE

## 2022-01-18 RX ORDER — MELATONIN
2 DAILY
Qty: 60 TABLET | Refills: 3 | COMMUNITY
Start: 2022-01-18 | End: 2022-08-24 | Stop reason: SDUPTHER

## 2022-01-18 RX ORDER — CARBOXYMETHYLCELLULOSE SODIUM 5 MG/ML
1 SOLUTION/ DROPS OPHTHALMIC 3 TIMES DAILY PRN
Qty: 1 EACH | Refills: 3 | Status: SHIPPED
Start: 2022-01-18 | End: 2022-02-01 | Stop reason: ALTCHOICE

## 2022-01-18 RX ORDER — ASCORBIC ACID 500 MG
500 TABLET ORAL DAILY
Qty: 30 TABLET | Refills: 3 | Status: SHIPPED
Start: 2022-01-18 | End: 2022-10-10 | Stop reason: ALTCHOICE

## 2022-01-18 NOTE — PROGRESS NOTES
S: 43 y.o. female here with 1 week of cough, congestion, eye pain, body aches and subjective fevers.  positive with Covid. Denies dyspnea. O: VS: /80   Pulse 108   Temp 97.7 °F (36.5 °C) (Temporal)   Resp 16   SpO2 98%    General: NAD   CV:  tachy   Resp: CTAB no R/R/W   Abd:  Soft, nontender, no masses    Ext:  no C/C/E   Nl tms   Pharyngeal erythema  Impression/Plan:   1. Myalgias with congestion-testing for covid and flu. Suspect COVID. Supportive care. 2. Uncontrolled dm2-follow up for labs etc    rto in 2 weeks of dm      Attending Physician Statement  I have discussed the case, including pertinent history and exam findings with the resident. I agree with the documented assessment and plan.         Elham Schultz MD

## 2022-01-18 NOTE — PATIENT INSTRUCTIONS
If your COVID test is positive, you should isolate at home for 5 days from the start of your symptoms and continue to wear a mask in public for at least the next 5 days. If you still have a fever of 100.4 or higher, continue your isolation for another 48 hours (2 days) after your fever resolves without any Tylenol or ibuprofen. Avoid exposing older people, pregnant women, and people with underlying health conditions. Should you develop difficulty breathing or chest pain you should call 911 and report to your local emergency room. The treatment for COVID in patients not requiring hospitalization is similar to the common cold, with no cure. Hydrate! Drink >64oz of water or pedialyte daily. Rest and try to walk as able. Fresh fruits and veggies are best!     Call the office if your symptoms do not improve after 7-10 days. Your cough may last longer than the rest of the symptoms. Feel better!

## 2022-01-18 NOTE — PROGRESS NOTES
Jessica La  1985    Chief Complaint   Patient presents with    Cough    Fever    Generalized Body Aches    Headache    Pharyngitis    Eye Pain       Respiratory Symptoms:  Patient complains of  Chief Complaint   Patient presents with    Cough    Fever    Generalized Body Aches    Headache    Pharyngitis    Eye Pain   1 week(s) history of fever: suspected but not measured and headache. Symptoms have been unchanged with time. Patients  has had the same symptom for 2 weeks and tested positive yesterday     Smoking history:  She  reports that she has never smoked. She has never used smokeless tobacco.     She  Has been taking Advil and tylenol to control fever     Treatment to date: Acetaminophen, NSAID. Travel screen completed:   Yes

## 2022-01-18 NOTE — Clinical Note
Hi! It actually won't let me addend your note. So can you finish your wild card when you have time so Dr. Fran Calderón can sign? Thanks!

## 2022-01-19 NOTE — PROGRESS NOTES
[address] [facility]  Department of Family Medicine  Family Medicine Residency Program      Patient:  Jessica Guillermo 43 y.o. female     Date of Service: 1/19/22      Chief complaint:   Chief Complaint   Patient presents with    Cough    Fever    Generalized Body Aches    Headache    Pharyngitis    Eye Pain         History of Present Illness   The patient is a 43 y.o. female with a PMH of diabetes who presents to the clinic for the following medical concerns:     URI symptoms: Positive COVID exposure in the past week. Has been sick at home for the past 2 weeks and tested positive a few days ago. Patient feels ill for the past week. Complaints include cough, congestion, fevers, chills and itchy watery eyes. Patient has been taking Tylenol and ibuprofen. Denies any shortness of breath or chest pain at this time. Requesting COVID testing. Past Medical History:      Diagnosis Date    Diabetes mellitus ([address]     Ovarian cyst        Past Surgical History:    No past surgical history on file. Allergies:    Patient has no known allergies. Social History:   Social History     Tobacco Use    Smoking status: Never Smoker    Smokeless tobacco: Never Used   Substance Use Topics    Alcohol use: Yes     Comment: social        Family History:       Problem Relation Age of Onset    Diabetes Mother     Diabetes Brother        Reviewof Systems:   Review of Systems Negative unless otherwise stated in HPI. Physical Exam   Vitals: /80   Pulse 108   Temp 97.7 °F (36.5 °C) (Temporal)   Resp 16   SpO2 98%        Physical Exam  Constitutional:       Appearance: Normal appearance. HENT:      Mouth/Throat:      Pharynx: Posterior oropharyngeal erythema present. No oropharyngeal exudate. Eyes:      Extraocular Movements: Extraocular movements intact. Conjunctiva/sclera: Conjunctivae normal.   Cardiovascular:      Rate and Rhythm: Normal rate and regular rhythm. Heart sounds:  No murmur heard. No friction rub. No gallop. Pulmonary:      Effort: Pulmonary effort is normal.      Breath sounds: Normal breath sounds. Abdominal:      General: Abdomen is flat. Palpations: Abdomen is soft. Musculoskeletal:         General: No swelling or tenderness. Cervical back: Normal range of motion and neck supple. Right lower leg: No edema. Left lower leg: No edema. Skin:     General: Skin is warm and dry. Neurological:      Mental Status: She is alert and oriented to person, place, and time. Mental status is at baseline. Psychiatric:         Mood and Affect: Mood normal.         Thought Content: Thought content normal.         Assessment and Plan       1. Cough with fever  Advised isolation   Hydration  Start vitamin cocktail for suspected COVID  - POCT Influenza A/B Antigen  - COVID-19 Ambulatory; Future      Return to Office: Return in about 2 weeks (around 2/1/2022) for health maintenance and follow up URI. Medication List:    Current Outpatient Medications   Medication Sig Dispense Refill    zinc 50 MG CAPS Take 50 mg by mouth daily 30 capsule 3    vitamin D3 (CHOLECALCIFEROL) 25 MCG (1000 UT) TABS tablet Take 2 tablets by mouth daily 60 tablet 3    vitamin C (ASCORBIC ACID) 500 MG tablet Take 1 tablet by mouth daily 30 tablet 3    carboxymethylcellulose (REFRESH TEARS) 0.5 % SOLN ophthalmic solution Place 1 drop into both eyes 3 times daily as needed (eye itching) 1 each 3    metFORMIN (GLUCOPHAGE) 1000 MG tablet Take 1 tablet by mouth 2 times daily (with meals) 180 tablet 1    naproxen (NAPROSYN) 500 MG tablet Take 1 tablet by mouth 2 times daily (with meals) 60 tablet 3    ibuprofen (ADVIL;MOTRIN) 200 MG tablet Take 200 mg by mouth every 6 hours as needed for Pain      blood glucose monitor kit and supplies Dispense sufficient amount for indicated testing frequency plus additional to accommodate PRN testing needs.  Dispense all needed supplies to include: monitor, strips, lancing device, lancets, control solutions, alcohol swabs. 1 kit 0    APAP-Pamabrom-Pyrilamine 500-25-15 MG TABS Take 1-2 tablets by mouth every 6 hours as needed (menstrual cramps) Max: 8 tablets in 24 hours. (Patient not taking: Reported on 8/5/2021) 30 tablet 0     No current facility-administered medications for this visit.         Jesus De Guzman MD     Electronically signed by Jesus De Guzman MD on 1/19/2022 at 1:25 PM

## 2022-01-20 LAB
SARS-COV-2: DETECTED
SOURCE: ABNORMAL

## 2022-02-01 ENCOUNTER — OFFICE VISIT (OUTPATIENT)
Dept: FAMILY MEDICINE CLINIC | Age: 37
End: 2022-02-01
Payer: COMMERCIAL

## 2022-02-01 VITALS
HEART RATE: 80 BPM | SYSTOLIC BLOOD PRESSURE: 118 MMHG | DIASTOLIC BLOOD PRESSURE: 83 MMHG | TEMPERATURE: 97.9 F | BODY MASS INDEX: 27.83 KG/M2 | WEIGHT: 163 LBS | RESPIRATION RATE: 16 BRPM | HEIGHT: 64 IN | OXYGEN SATURATION: 100 %

## 2022-02-01 DIAGNOSIS — Z11.59 ENCOUNTER FOR HEPATITIS C SCREENING TEST FOR LOW RISK PATIENT: ICD-10-CM

## 2022-02-01 DIAGNOSIS — E11.65 UNCONTROLLED TYPE 2 DIABETES MELLITUS WITH HYPERGLYCEMIA (HCC): ICD-10-CM

## 2022-02-01 DIAGNOSIS — Z11.4 SCREENING FOR HIV (HUMAN IMMUNODEFICIENCY VIRUS): ICD-10-CM

## 2022-02-01 DIAGNOSIS — R05.9 COUGH: ICD-10-CM

## 2022-02-01 DIAGNOSIS — E11.65 UNCONTROLLED TYPE 2 DIABETES MELLITUS WITH HYPERGLYCEMIA (HCC): Primary | ICD-10-CM

## 2022-02-01 DIAGNOSIS — Z76.0 MEDICATION REFILL: ICD-10-CM

## 2022-02-01 LAB
ALBUMIN SERPL-MCNC: 4.6 G/DL (ref 3.5–5.2)
ALP BLD-CCNC: 28 U/L (ref 35–104)
ALT SERPL-CCNC: 24 U/L (ref 0–32)
ANION GAP SERPL CALCULATED.3IONS-SCNC: 16 MMOL/L (ref 7–16)
AST SERPL-CCNC: 40 U/L (ref 0–31)
BASOPHILS ABSOLUTE: 0.03 E9/L (ref 0–0.2)
BASOPHILS RELATIVE PERCENT: 0.6 % (ref 0–2)
BILIRUB SERPL-MCNC: 0.3 MG/DL (ref 0–1.2)
BUN BLDV-MCNC: 10 MG/DL (ref 6–20)
CALCIUM SERPL-MCNC: 9.2 MG/DL (ref 8.6–10.2)
CHLORIDE BLD-SCNC: 102 MMOL/L (ref 98–107)
CHOLESTEROL, TOTAL: 178 MG/DL (ref 0–199)
CO2: 18 MMOL/L (ref 22–29)
CREAT SERPL-MCNC: 0.6 MG/DL (ref 0.5–1)
CREATININE URINE: 116 MG/DL (ref 29–226)
EOSINOPHILS ABSOLUTE: 0.05 E9/L (ref 0.05–0.5)
EOSINOPHILS RELATIVE PERCENT: 1.1 % (ref 0–6)
FOLATE: >20 NG/ML (ref 4.8–24.2)
GFR AFRICAN AMERICAN: >60
GFR NON-AFRICAN AMERICAN: >60 ML/MIN/1.73
GLUCOSE BLD-MCNC: 124 MG/DL (ref 74–99)
HBA1C MFR BLD: 7.2 % (ref 4–5.6)
HCT VFR BLD CALC: 39.3 % (ref 34–48)
HDLC SERPL-MCNC: 55 MG/DL
HEMOGLOBIN: 12 G/DL (ref 11.5–15.5)
IMMATURE GRANULOCYTES #: 0.02 E9/L
IMMATURE GRANULOCYTES %: 0.4 % (ref 0–5)
LDL CHOLESTEROL CALCULATED: 114 MG/DL (ref 0–99)
LYMPHOCYTES ABSOLUTE: 1.9 E9/L (ref 1.5–4)
LYMPHOCYTES RELATIVE PERCENT: 41.1 % (ref 20–42)
MCH RBC QN AUTO: 23.8 PG (ref 26–35)
MCHC RBC AUTO-ENTMCNC: 30.5 % (ref 32–34.5)
MCV RBC AUTO: 78 FL (ref 80–99.9)
MICROALBUMIN UR-MCNC: <12 MG/L
MICROALBUMIN/CREAT UR-RTO: ABNORMAL (ref 0–30)
MONOCYTES ABSOLUTE: 0.33 E9/L (ref 0.1–0.95)
MONOCYTES RELATIVE PERCENT: 7.1 % (ref 2–12)
NEUTROPHILS ABSOLUTE: 2.29 E9/L (ref 1.8–7.3)
NEUTROPHILS RELATIVE PERCENT: 49.7 % (ref 43–80)
PDW BLD-RTO: 18.2 FL (ref 11.5–15)
PLATELET # BLD: 345 E9/L (ref 130–450)
PMV BLD AUTO: 12.2 FL (ref 7–12)
POTASSIUM SERPL-SCNC: 5.3 MMOL/L (ref 3.5–5)
RBC # BLD: 5.04 E12/L (ref 3.5–5.5)
SODIUM BLD-SCNC: 136 MMOL/L (ref 132–146)
TOTAL PROTEIN: 8.4 G/DL (ref 6.4–8.3)
TRIGL SERPL-MCNC: 47 MG/DL (ref 0–149)
TSH SERPL DL<=0.05 MIU/L-ACNC: 0.86 UIU/ML (ref 0.27–4.2)
VITAMIN B-12: 723 PG/ML (ref 211–946)
VLDLC SERPL CALC-MCNC: 9 MG/DL
WBC # BLD: 4.6 E9/L (ref 4.5–11.5)

## 2022-02-01 PROCEDURE — 3051F HG A1C>EQUAL 7.0%<8.0%: CPT | Performed by: FAMILY MEDICINE

## 2022-02-01 PROCEDURE — G8419 CALC BMI OUT NRM PARAM NOF/U: HCPCS | Performed by: FAMILY MEDICINE

## 2022-02-01 PROCEDURE — G8484 FLU IMMUNIZE NO ADMIN: HCPCS | Performed by: FAMILY MEDICINE

## 2022-02-01 PROCEDURE — 99213 OFFICE O/P EST LOW 20 MIN: CPT | Performed by: FAMILY MEDICINE

## 2022-02-01 PROCEDURE — G8427 DOCREV CUR MEDS BY ELIG CLIN: HCPCS | Performed by: FAMILY MEDICINE

## 2022-02-01 PROCEDURE — 99212 OFFICE O/P EST SF 10 MIN: CPT | Performed by: FAMILY MEDICINE

## 2022-02-01 PROCEDURE — 36415 COLL VENOUS BLD VENIPUNCTURE: CPT | Performed by: FAMILY MEDICINE

## 2022-02-01 PROCEDURE — 1036F TOBACCO NON-USER: CPT | Performed by: FAMILY MEDICINE

## 2022-02-01 PROCEDURE — 2022F DILAT RTA XM EVC RTNOPTHY: CPT | Performed by: FAMILY MEDICINE

## 2022-02-01 RX ORDER — GUAIFENESIN DEXTROMETHORPHAN HYDROBROMIDE ORAL SOLUTION 10; 100 MG/5ML; MG/5ML
10 SOLUTION ORAL EVERY 4 HOURS PRN
COMMUNITY
Start: 2022-02-01 | End: 2022-10-10 | Stop reason: ALTCHOICE

## 2022-02-01 RX ORDER — NAPROXEN 500 MG/1
500 TABLET ORAL 2 TIMES DAILY WITH MEALS
Qty: 60 TABLET | Refills: 3 | Status: SHIPPED
Start: 2022-02-01 | End: 2022-08-24 | Stop reason: SDUPTHER

## 2022-02-01 ASSESSMENT — PATIENT HEALTH QUESTIONNAIRE - PHQ9
1. LITTLE INTEREST OR PLEASURE IN DOING THINGS: 0
SUM OF ALL RESPONSES TO PHQ QUESTIONS 1-9: 0
2. FEELING DOWN, DEPRESSED OR HOPELESS: 0
SUM OF ALL RESPONSES TO PHQ9 QUESTIONS 1 & 2: 0

## 2022-02-01 NOTE — PROGRESS NOTES
Attending Physician Statement    S:   Chief Complaint   Patient presents with    Diabetes         cough           Recent COVID - still slight cough   DM - no symptoms. Non adherent for diabetes education or diet  O: Blood pressure 118/83, pulse 80, temperature 97.9 °F (36.6 °C), temperature source Temporal, resp. rate 16, height 5' 4\" (1.626 m), weight 163 lb (73.9 kg), SpO2 100 %. Exam:   Heart - RRR   Lungs - clear   No edema  A: As above  P:  A1C, other labs   Robitussin for cough   Follow-up as ordered    I have discussed the case, including pertinent history and exam findings with the resident. I agree with the documented assessment and plan.

## 2022-02-01 NOTE — PROGRESS NOTES
CC: COVID-19 follow-up, DM type II follow    HPI:  43 y.o. female with PMH of DM type II presents for DM type II follow-up, follow-up for COVID-19    DM II -last A1c 9.5   Patient was newly diagnosed with diabetes last year around April 2021  Was started on Metformin 1000 mg twice daily  Has had limited follow-up since then  Repeat A1c ordered today-7.2  Patient states that she has tried to maintain changes in her diet; does have cheat days but otherwise trying to eat healthy, has remained compliant with her medications  Has not had an eye appointment yet; amenable to referral today  Was given referral for diabetic education-patient did not go. Follow Up Covid 19  Patient was diagnosed 1/18/2022  At the time she says she had fevers, cough with sputum production, and some eye pain  Most of her symptoms have resolved  Does continue to have a productive cough, which is improving  Would like medication to help improve cough symptom    Health maintenance  Due for blood work and repeat A1c today    No other complaints at this time    ROS:    Pertinent as above otherwise    Objective:    VS:  Blood pressure 118/83, pulse 80, temperature 97.9 °F (36.6 °C), temperature source Temporal, resp. rate 16, height 5' 4\" (1.626 m), weight 163 lb (73.9 kg), SpO2 100 %. Physical Exam  Cardiovascular:      Rate and Rhythm: Normal rate and regular rhythm. Pulses: Normal pulses. Heart sounds: Normal heart sounds. No murmur heard. No friction rub. No gallop. Pulmonary:      Effort: Pulmonary effort is normal. No respiratory distress. Breath sounds: Normal breath sounds. No stridor. No wheezing, rhonchi or rales. Assessment/Plan:    1.  Uncontrolled type 2 diabetes mellitus with hyperglycemia (HCC)  HbA1c repeat today 7.2, much improved from previous  Continue Metformin 1000 mg twice daily  Will add lipid panel, complete previous lab work ordered today including CBC, CMP, TSH microalbumin/urine testing  We will refer patient to optometry: [facility]  - metFORMIN (GLUCOPHAGE) 1000 MG tablet; Take 1 tablet by mouth 2 times daily (with meals)  Dispense: 180 tablet; Refill: 1  - LIPID PANEL; Future  - HEMOGLOBIN A1C; Future  - External Referral To Optometry    2. Cough  Residual symptoms secondary to COVID-19  Cough medication ordered  - dextromethorphan-guaiFENesin (DIABETIC TUSSIN DM)  MG/5ML syrup; Take 10 mLs by mouth every 4 hours as needed for Cough    3. Medication refill  Refill naproxen     RTO 1 month per patient request or sooner prn for any persistent, new, or worsening symptoms. Please see Patient Instructions for further counseling and information given. Advised to please be adherent to the treatment plans discussed today, and please call with any questions or concerns, letting the office know of any reasons that the plans may not be followed. The risks of untreated conditions include worsening illness, injury, disability, and possibly, death. Please call if symptoms change in any way, worsen, or fail to completely resolve, as this could necessitate a change to treatment plans. Patient and/or caregiver expressed understanding. Indications and proper use of medication(s) reviewed. Potential side-effects and risks of medication(s) also explained. Patient and/or caregiver was instructed to call if any new symptoms develop prior to next visit. Health risk factors discussed and addressed.      Electronically signed by Muna Diaz MD PGY-2 on 2/1/2022 at 11:36 AM  This case was discussed with attending physician: Dr. Melyssa Ho

## 2022-02-02 LAB
HEPATITIS C ANTIBODY INTERPRETATION: NORMAL
HIV-1 AND HIV-2 ANTIBODIES: NORMAL

## 2022-05-05 ENCOUNTER — OFFICE VISIT (OUTPATIENT)
Dept: FAMILY MEDICINE CLINIC | Age: 37
End: 2022-05-05
Payer: COMMERCIAL

## 2022-05-05 VITALS
OXYGEN SATURATION: 99 % | DIASTOLIC BLOOD PRESSURE: 68 MMHG | TEMPERATURE: 97.5 F | HEIGHT: 64 IN | RESPIRATION RATE: 17 BRPM | SYSTOLIC BLOOD PRESSURE: 118 MMHG | HEART RATE: 106 BPM | BODY MASS INDEX: 27.71 KG/M2 | WEIGHT: 162.3 LBS

## 2022-05-05 DIAGNOSIS — F41.9 ANXIETY AND DEPRESSION: ICD-10-CM

## 2022-05-05 DIAGNOSIS — F32.A ANXIETY AND DEPRESSION: ICD-10-CM

## 2022-05-05 DIAGNOSIS — E11.65 UNCONTROLLED TYPE 2 DIABETES MELLITUS WITH HYPERGLYCEMIA (HCC): Primary | ICD-10-CM

## 2022-05-05 DIAGNOSIS — Z13.31 POSITIVE DEPRESSION SCREENING: ICD-10-CM

## 2022-05-05 LAB — HBA1C MFR BLD: 7.3 %

## 2022-05-05 PROCEDURE — G8419 CALC BMI OUT NRM PARAM NOF/U: HCPCS | Performed by: FAMILY MEDICINE

## 2022-05-05 PROCEDURE — 3051F HG A1C>EQUAL 7.0%<8.0%: CPT | Performed by: FAMILY MEDICINE

## 2022-05-05 PROCEDURE — 1036F TOBACCO NON-USER: CPT | Performed by: FAMILY MEDICINE

## 2022-05-05 PROCEDURE — 99212 OFFICE O/P EST SF 10 MIN: CPT | Performed by: FAMILY MEDICINE

## 2022-05-05 PROCEDURE — 2022F DILAT RTA XM EVC RTNOPTHY: CPT | Performed by: FAMILY MEDICINE

## 2022-05-05 PROCEDURE — 83036 HEMOGLOBIN GLYCOSYLATED A1C: CPT | Performed by: FAMILY MEDICINE

## 2022-05-05 PROCEDURE — 99213 OFFICE O/P EST LOW 20 MIN: CPT | Performed by: FAMILY MEDICINE

## 2022-05-05 PROCEDURE — G8427 DOCREV CUR MEDS BY ELIG CLIN: HCPCS | Performed by: FAMILY MEDICINE

## 2022-05-05 ASSESSMENT — PATIENT HEALTH QUESTIONNAIRE - PHQ9
SUM OF ALL RESPONSES TO PHQ9 QUESTIONS 1 & 2: 6
3. TROUBLE FALLING OR STAYING ASLEEP: 3
4. FEELING TIRED OR HAVING LITTLE ENERGY: 3
SUM OF ALL RESPONSES TO PHQ QUESTIONS 1-9: 24
5. POOR APPETITE OR OVEREATING: 3
SUM OF ALL RESPONSES TO PHQ QUESTIONS 1-9: 24
6. FEELING BAD ABOUT YOURSELF - OR THAT YOU ARE A FAILURE OR HAVE LET YOURSELF OR YOUR FAMILY DOWN: 3
8. MOVING OR SPEAKING SO SLOWLY THAT OTHER PEOPLE COULD HAVE NOTICED. OR THE OPPOSITE, BEING SO FIGETY OR RESTLESS THAT YOU HAVE BEEN MOVING AROUND A LOT MORE THAN USUAL: 3
10. IF YOU CHECKED OFF ANY PROBLEMS, HOW DIFFICULT HAVE THESE PROBLEMS MADE IT FOR YOU TO DO YOUR WORK, TAKE CARE OF THINGS AT HOME, OR GET ALONG WITH OTHER PEOPLE: 1
SUM OF ALL RESPONSES TO PHQ QUESTIONS 1-9: 24
9. THOUGHTS THAT YOU WOULD BE BETTER OFF DEAD, OR OF HURTING YOURSELF: 0
7. TROUBLE CONCENTRATING ON THINGS, SUCH AS READING THE NEWSPAPER OR WATCHING TELEVISION: 3
SUM OF ALL RESPONSES TO PHQ QUESTIONS 1-9: 24
1. LITTLE INTEREST OR PLEASURE IN DOING THINGS: 3
2. FEELING DOWN, DEPRESSED OR HOPELESS: 3

## 2022-05-05 ASSESSMENT — ANXIETY QUESTIONNAIRES
IF YOU CHECKED OFF ANY PROBLEMS ON THIS QUESTIONNAIRE, HOW DIFFICULT HAVE THESE PROBLEMS MADE IT FOR YOU TO DO YOUR WORK, TAKE CARE OF THINGS AT HOME, OR GET ALONG WITH OTHER PEOPLE: SOMEWHAT DIFFICULT
7. FEELING AFRAID AS IF SOMETHING AWFUL MIGHT HAPPEN: 1
1. FEELING NERVOUS, ANXIOUS, OR ON EDGE: 1
2. NOT BEING ABLE TO STOP OR CONTROL WORRYING: 2
GAD7 TOTAL SCORE: 8
5. BEING SO RESTLESS THAT IT IS HARD TO SIT STILL: 1
4. TROUBLE RELAXING: 1
6. BECOMING EASILY ANNOYED OR IRRITABLE: 1
3. WORRYING TOO MUCH ABOUT DIFFERENT THINGS: 1

## 2022-05-05 ASSESSMENT — LIFESTYLE VARIABLES
HOW MANY STANDARD DRINKS CONTAINING ALCOHOL DO YOU HAVE ON A TYPICAL DAY: 1 OR 2
HOW OFTEN DO YOU HAVE A DRINK CONTAINING ALCOHOL: NEVER

## 2022-05-05 NOTE — PROGRESS NOTES
CC:  Follow up DM II , Anxiety/ Depression    HPI:  43 y.o. female presents for DM II follow up , concern for worsening anxiety/depression     DM II , non insulin dependant  Last A1C 7.2 (2/1/2022)  Patient presently managed with Metformin 1000 mg BID only  Doing well, no recent concerns  Tolerating metformin well  Denies any nausea, vomiting, hypoglycemic or hyperglycemic  No polyuria, polydipsia, polyphagia  Due for diabetic foot exam today    Anxiety/Depression  Was on zoloft 50 mg - self discontinued as she was feeling better  Now feeling anxious and depressed again. Notes she has a 16 y.o. daughter who is presently giving her a lot of \"stress\". Requesting guidance with a Wol[place] speaking therapist ; considering family therapy. HM  Due for pap smear ; next visit  Would like to discuss vaccines next visits    ROS:    As above, otherwise negative    Objective:    VS:  Blood pressure 118/68, pulse 106, temperature 97.5 °F (36.4 °C), temperature source Temporal, resp. rate 17, height 5' 4\" (1.626 m), weight 162 lb 4.8 oz (73.6 kg), SpO2 99 %. Physical Exam  Constitutional:       Appearance: She is not ill-appearing or diaphoretic. HENT:      Right Ear: Tympanic membrane, ear canal and external ear normal. There is no impacted cerumen. Left Ear: Tympanic membrane, ear canal and external ear normal. There is no impacted cerumen. Nose: Nose normal. No congestion or rhinorrhea. Mouth/Throat:      Mouth: Mucous membranes are moist.      Pharynx: Oropharynx is clear. No oropharyngeal exudate or posterior oropharyngeal erythema. Cardiovascular:      Rate and Rhythm: Normal rate and regular rhythm. Pulses: Normal pulses. Heart sounds: Normal heart sounds. No murmur heard. No gallop. Pulmonary:      Effort: Pulmonary effort is normal. No respiratory distress. Breath sounds: No wheezing. Abdominal:      General: Bowel sounds are normal. There is no distension.       Tenderness: There is no abdominal tenderness. Musculoskeletal:      Cervical back: Normal range of motion and neck supple. No rigidity or tenderness. Neurological:      Mental Status: She is alert and oriented to person, place, and time. Comments: Monofilament test wnl       Assessment/Plan:    1. Uncontrolled type 2 diabetes mellitus with hyperglycemia (HCC)  Repeat A1C 7.3  Continue present management and diet control  - POCT glycosylated hemoglobin (Hb A1C)  - Diabetic Foot Exam ; wnl    2. Anxiety and depression  Restart Zoloft 50 mg  PHQ 9 score - 24  Refer to [place] Counsellor   Continue to monitor ; likely many triggers coming from anxiety  Close monitoring and continued visits may be beneficial  - sertraline (ZOLOFT) 50 MG tablet; take 1 tablet by mouth once daily  Dispense: 90 tablet; Refill: 1  - External Referral To Psychology    3. Positive depression screening  PHQ 9 score - 24  - External Referral To Psychology     RTO 3 months or sooner prn for any persistent, new, or worsening symptoms. Please see Patient Instructions for further counseling and information given. Advised to please be adherent to the treatment plans discussed today, and please call with any questions or concerns, letting the office know of any reasons that the plans may not be followed. The risks of untreated conditions include worsening illness, injury, disability, and possibly, death. Please call if symptoms change in any way, worsen, or fail to completely resolve, as this could necessitate a change to treatment plans. Patient and/or caregiver expressed understanding. Indications and proper use of medication(s) reviewed. Potential side-effects and risks of medication(s) also explained. Patient and/or caregiver was instructed to call if any new symptoms develop prior to next visit. Health risk factors discussed and addressed.      Electronically signed by Mk Laughlin MD PGY-2 on 5/5/2022 at 3:08 PM  This case was discussed with attending physician: Dr. Nurys Mack    PHQ-9 score today: (PHQ-9 Total Score: 24), additional evaluation and assessment performed, follow-up plan includes but not limited to: Medication management and Referral to /Specialist  for evaluation and management.

## 2022-05-05 NOTE — PROGRESS NOTES
S: Kaylyn Robbins 43 y.o. female  here for follow-up. Non-insulin-dependent diabetes, anxiety/depression. Non-insulin-requiring diabetes: Subjectively doing very well with optimal lifestyle [place]. Taking metformin 1000 mg twice daily. Last hemoglobin A1c 3 months ago was 7.2%; POCT hemoglobin A1c pending today. Anxiety/depression. PHQ-9 = 24. Further history; patient discontinued sertraline 50 mg daily on her own. Experiencing lot of stress with her teenage daughter. Inquired about Serbian counseling. O: VS: /68 (Site: Left Upper Arm, Position: Sitting, Cuff Size: Medium Adult)   Pulse 106   Temp 97.5 °F (36.4 °C) (Temporal)   Resp 17   Ht 5' 4\" (1.626 m)   Wt 162 lb 4.8 oz (73.6 kg)   SpO2 99%   BMI 27.86 kg/m²    General: NAD              HEENT: WDL              Neck: WDL   CV:  RRR, no gallops, rubs, or murmurs   Resp: CTAB no R/R/W   Abd:  Soft, nontender, no masses    Ext:  no C/C/E. Diabetic foot exam WDL    No results found for this visit on 05/05/22. Assessment / Plan:      Elsa was seen today for follow-up, diabetes, depression and anxiety. Diagnoses and all orders for this visit:    1. Uncontrolled type 2 diabetes mellitus with hyperglycemia (HCC)  Repeat A1C 7.3  Continue present management and diet control  - POCT glycosylated hemoglobin (Hb A1C)  - Diabetic Foot Exam ; wnl     2. Anxiety and depression  Restart Zoloft 50 mg  PHQ 9 score - 24  Refer to Lithuanian Counsellor   Continue to monitor ; likely many triggers coming from anxiety  Close monitoring and continued visits may be beneficial  - sertraline (ZOLOFT) 50 MG tablet; take 1 tablet by mouth once daily  Dispense: 90 tablet; Refill: 1  - External Referral To Psychology     3. Positive depression screening  PHQ 9 score - 24  - External Referral To Psychology     RTO 3 months or sooner prn for any persistent, new, or worsening symptoms. Assessment/Plan:  1.   Non-insulin-requiring diabetes: Stable and well controlled. Continue lifestyle medicine and metformin 1000 mg twice daily. Diabetic foot exam performed today. 2.  Anxiety/depression: Uncontrolled at this time. Patient advised to resume sertraline 50 mg a day. Referral for Yakut-speaking counselor for the family placed. Return in about 3 months (around 8/5/2022) for Pap smear, DM II. Follow-up in 1 month    Attending Physician Statement  I have discussed the case, including pertinent history and exam findings with the resident. I agree with the documented assessment and plan.          Cheng Ramos MD

## 2022-08-24 ENCOUNTER — OFFICE VISIT (OUTPATIENT)
Dept: FAMILY MEDICINE CLINIC | Age: 37
End: 2022-08-24
Payer: COMMERCIAL

## 2022-08-24 VITALS
DIASTOLIC BLOOD PRESSURE: 70 MMHG | RESPIRATION RATE: 16 BRPM | OXYGEN SATURATION: 100 % | HEIGHT: 64 IN | HEART RATE: 80 BPM | BODY MASS INDEX: 27.9 KG/M2 | WEIGHT: 163.4 LBS | SYSTOLIC BLOOD PRESSURE: 112 MMHG | TEMPERATURE: 97.1 F

## 2022-08-24 DIAGNOSIS — F32.A ANXIETY AND DEPRESSION: ICD-10-CM

## 2022-08-24 DIAGNOSIS — F41.9 ANXIETY AND DEPRESSION: ICD-10-CM

## 2022-08-24 DIAGNOSIS — M54.50 ACUTE RIGHT-SIDED LOW BACK PAIN WITHOUT SCIATICA: ICD-10-CM

## 2022-08-24 DIAGNOSIS — M54.6 THORACOLUMBAR BACK PAIN: ICD-10-CM

## 2022-08-24 DIAGNOSIS — M54.50 THORACOLUMBAR BACK PAIN: ICD-10-CM

## 2022-08-24 DIAGNOSIS — E11.65 UNCONTROLLED TYPE 2 DIABETES MELLITUS WITH HYPERGLYCEMIA (HCC): ICD-10-CM

## 2022-08-24 DIAGNOSIS — E11.65 UNCONTROLLED TYPE 2 DIABETES MELLITUS WITH HYPERGLYCEMIA (HCC): Primary | ICD-10-CM

## 2022-08-24 LAB
BILIRUBIN, POC: NEGATIVE
BLOOD URINE, POC: NEGATIVE
CLARITY, POC: CLEAR
COLOR, POC: YELLOW
CREATININE URINE: 134 MG/DL (ref 29–226)
GLUCOSE URINE, POC: 250
HBA1C MFR BLD: 7.4 %
KETONES, POC: NEGATIVE
LEUKOCYTE EST, POC: NEGATIVE
MICROALBUMIN UR-MCNC: <12 MG/L
MICROALBUMIN/CREAT UR-RTO: ABNORMAL (ref 0–30)
NITRITE, POC: NEGATIVE
PH, POC: 6
PROTEIN, POC: NEGATIVE
SPECIFIC GRAVITY, POC: 1.03
UROBILINOGEN, POC: 0.2

## 2022-08-24 PROCEDURE — 99214 OFFICE O/P EST MOD 30 MIN: CPT | Performed by: FAMILY MEDICINE

## 2022-08-24 PROCEDURE — 3051F HG A1C>EQUAL 7.0%<8.0%: CPT | Performed by: FAMILY MEDICINE

## 2022-08-24 PROCEDURE — 83036 HEMOGLOBIN GLYCOSYLATED A1C: CPT | Performed by: FAMILY MEDICINE

## 2022-08-24 PROCEDURE — 81002 URINALYSIS NONAUTO W/O SCOPE: CPT | Performed by: FAMILY MEDICINE

## 2022-08-24 RX ORDER — MELATONIN
2 DAILY
Qty: 60 TABLET | Refills: 3 | Status: SHIPPED
Start: 2022-08-24 | End: 2022-10-10 | Stop reason: ALTCHOICE

## 2022-08-24 RX ORDER — NAPROXEN 500 MG/1
500 TABLET ORAL 2 TIMES DAILY WITH MEALS
Qty: 60 TABLET | Refills: 3 | Status: SHIPPED
Start: 2022-08-24 | End: 2022-10-10 | Stop reason: ALTCHOICE

## 2022-08-24 SDOH — ECONOMIC STABILITY: FOOD INSECURITY: WITHIN THE PAST 12 MONTHS, YOU WORRIED THAT YOUR FOOD WOULD RUN OUT BEFORE YOU GOT MONEY TO BUY MORE.: NEVER TRUE

## 2022-08-24 SDOH — ECONOMIC STABILITY: FOOD INSECURITY: WITHIN THE PAST 12 MONTHS, THE FOOD YOU BOUGHT JUST DIDN'T LAST AND YOU DIDN'T HAVE MONEY TO GET MORE.: NEVER TRUE

## 2022-08-24 ASSESSMENT — ENCOUNTER SYMPTOMS
EYE PAIN: 0
CONSTIPATION: 0
BLOOD IN STOOL: 0
VOMITING: 0
NAUSEA: 0
DIARRHEA: 0
BACK PAIN: 1
COUGH: 0
SHORTNESS OF BREATH: 0
ABDOMINAL PAIN: 0
EYE DISCHARGE: 0

## 2022-08-24 ASSESSMENT — SOCIAL DETERMINANTS OF HEALTH (SDOH): HOW HARD IS IT FOR YOU TO PAY FOR THE VERY BASICS LIKE FOOD, HOUSING, MEDICAL CARE, AND HEATING?: NOT HARD AT ALL

## 2022-08-24 NOTE — LETTER
[address]  Phone: [phone]  Fax: [phone]    Edyta Tristan MD        August 24, 2022     Patient: Irene Giraldo   YOB: 1985   Date of Visit: 8/24/2022       To Whom it May Concern:    Irene Giraldo was seen in my clinic on 8/24/2022. She is due to return to office in 2 weeks. If you have any questions or concerns, please don't hesitate to call.     Sincerely,         Edyta Tristan MD

## 2022-08-24 NOTE — PROGRESS NOTES
[facility]  DATE OF VISIT : 2022    Patient : Junior Ruiz   Age : 43 y.o.    : 1985   MRN : [id number]   ______________________________________________________________________    Chief Complaint :   Chief Complaint   Patient presents with    Follow-up Chronic Condition     Patient is here today for a follow up for chronic conditions. Medications have been reviewed and is complying, refills requested today. Diabetes     Not checking sugars at home because she states she is afraid of poking fingers. A1C checked today. Breast Problem     Discuss possible breast reduction due to heavy breasts and pain in upper mid back. Urinary Tract Infection     C/o low back pain for 2 weeks. Urinalysis done today. HPI : Junior Ruiz is 43 y.o. female who presented to the clinic today for diabetes and depression follow-up    NIDDM: Currently on metformin as 1000 mg BID. Last A1c 7.3 on 2022. Aristides George not been checking BG at home, is afraid of the needles. Despite dietary and lifestyle modifications A1C has not lowered. Anxiety/depression: Patient previously on Zoloft 50 mg, had stopped it on her own a couple months prior to her last visit. During last appointment Zoloft 50 mg was restarted. Lower back pain: intermittently, localized to the lateral low back pain. Denies any saddle paresthesia, bladder or bowel incontinence. Denies any lower extremity weakness, numbness or tingling. Denies any dysuria, hematuria, frequency or urgency. Patient also complaining of upper back pain, notes that despite attempting stretches and strengthening exercises at home she still has pain in her thoracic lumbar spine. Also complaining of pressure being excited by the weight of her breast on her shoulders and upper back. I reviewed the patient's past medications, allergies and past medical history during this visit.     Past Medical History :    Past Medical History: Diagnosis Date    Diabetes mellitus ([facility] [address]     Ovarian cyst      No past surgical history on file. Social History :  Social History       Tobacco History       Smoking Status  Never      Smokeless Tobacco Use  Never              Alcohol History       Alcohol Use Status  Yes Comment  social              Drug Use       Drug Use Status  Never              Sexual Activity       Sexually Active  Not Asked                     Allergies :   No Known Allergies    Medication List :    Current Outpatient Medications   Medication Sig Dispense Refill    sertraline (ZOLOFT) 50 MG tablet take 1 tablet by mouth once daily 90 tablet 1    metFORMIN (GLUCOPHAGE) 1000 MG tablet Take 1 tablet by mouth 2 times daily (with meals) 180 tablet 1    naproxen (NAPROSYN) 500 MG tablet Take 1 tablet by mouth 2 times daily (with meals) 60 tablet 3    vitamin D3 (CHOLECALCIFEROL) 25 MCG (1000 UT) TABS tablet Take 2 tablets by mouth daily 60 tablet 3    SITagliptin (JANUVIA) 50 MG tablet Take 1 tablet by mouth daily 90 tablet 1    zinc 50 MG CAPS Take 50 mg by mouth daily 30 capsule 3    vitamin C (ASCORBIC ACID) 500 MG tablet Take 1 tablet by mouth daily 30 tablet 3    blood glucose monitor kit and supplies Dispense sufficient amount for indicated testing frequency plus additional to accommodate PRN testing needs. Dispense all needed supplies to include: monitor, strips, lancing device, lancets, control solutions, alcohol swabs. 1 kit 0    dextromethorphan-guaiFENesin (DIABETIC TUSSIN DM)  MG/5ML syrup Take 10 mLs by mouth every 4 hours as needed for Cough (Patient not taking: Reported on 8/24/2022)       No current facility-administered medications for this visit. Review of Systems :  Review of Systems   Constitutional:  Negative for chills, diaphoresis and fever. Eyes:  Negative for pain, discharge and visual disturbance. Respiratory:  Negative for cough and shortness of breath.     Cardiovascular:  Negative for chest pain, palpitations and leg swelling. Gastrointestinal:  Negative for abdominal pain, blood in stool, constipation, diarrhea, nausea and vomiting. Endocrine: Positive for polyuria. Genitourinary:  Negative for difficulty urinating, dysuria, hematuria and urgency. Musculoskeletal:  Positive for arthralgias and back pain. Negative for joint swelling and myalgias. Neurological:  Negative for dizziness, weakness, light-headedness, numbness and headaches. Psychiatric/Behavioral:  Negative for behavioral problems and sleep disturbance. The patient is not nervous/anxious. ______________________________________________________________________    Physical Exam :    Vitals: /70   Pulse 80   Temp 97.1 °F (36.2 °C) (Temporal)   Resp 16   Ht 5' 4\" (1.626 m)   Wt 163 lb 6.4 oz (74.1 kg)   LMP 08/23/2022 (Exact Date)   SpO2 100%   BMI 28.05 kg/m²   Physical Exam  Vitals reviewed. Constitutional:       General: She is not in acute distress. Appearance: Normal appearance. She is not ill-appearing. Cardiovascular:      Rate and Rhythm: Normal rate and regular rhythm. Pulses: Normal pulses. Heart sounds: Normal heart sounds. No murmur heard. Pulmonary:      Effort: Pulmonary effort is normal. No respiratory distress. Breath sounds: Normal breath sounds. No wheezing. Abdominal:      General: Bowel sounds are normal. There is no distension. Palpations: Abdomen is soft. Tenderness: There is no abdominal tenderness. Musculoskeletal:      Right lower leg: No edema. Left lower leg: No edema. Comments: Back: ROM intact, no midline or paraspinal tenderness to palpation. Slight indentation from bra strap noted on bilateral shoulders. Neurological:      Mental Status: She is alert.         ___________________    Assessment & Plan :    1.  Uncontrolled type 2 diabetes mellitus with hyperglycemia (HCC)  -Discussed the importance of blood glucose monitoring at home  -Despite lifestyle and dietary modifications over the last 6 to 8 months there has been no change in her A1c.  -Discussed with patient adding new medication, at the time she does not want to have to inject herself.  -We will trial Januvia. -Discussed with patient possible side effects and when to stop Januvia if needed  - metFORMIN (GLUCOPHAGE) 1000 MG tablet; Take 1 tablet by mouth 2 times daily (with meals)  Dispense: 180 tablet; Refill: 1  - POCT glycosylated hemoglobin (Hb A1C)  - MICROALBUMIN / CREATININE URINE RATIO; Future  - SITagliptin (JANUVIA) 50 MG tablet; Take 1 tablet by mouth daily  Dispense: 90 tablet; Refill: 1    2. Anxiety and depression  -Stable on the Zoloft 50 mg (once restarted)  -Continue present management  - sertraline (ZOLOFT) 50 MG tablet; take 1 tablet by mouth once daily  Dispense: 90 tablet; Refill: 1    3. Acute right-sided low back pain without sciatica  - POCT Urinalysis no Micro  - [facility], Maria G Zhane    4. Thoracolumbar back pain  -Patient would possibly benefit from breast reduction surgery if PT proves to not be efficient for symptom control  -Discussed with patient possibility of consulting plastic surgery, at the time she wants to try physical therapy first and then if that does not work she will like to see the surgeon. - [address] [place] [address], [employer] and/or home exercises printed for patient's review and were included in patient instructions on his/her After Visit Summary and given to patient at the end of visit. Counseled regarding above diagnosis, including possible risks and complications,  especially if left uncontrolled. Counseled regarding the possible side effects, risks, benefits and alternatives to treatment; patient and/or guardian verbalizes understanding, agrees, feels comfortable with and wishes to proceed with above treatment plan.      Advised patient to call with any new medication

## 2022-09-07 ENCOUNTER — OFFICE VISIT (OUTPATIENT)
Dept: FAMILY MEDICINE CLINIC | Age: 37
End: 2022-09-07
Payer: COMMERCIAL

## 2022-09-07 VITALS
BODY MASS INDEX: 28.17 KG/M2 | SYSTOLIC BLOOD PRESSURE: 110 MMHG | TEMPERATURE: 97.2 F | HEIGHT: 64 IN | RESPIRATION RATE: 16 BRPM | WEIGHT: 165 LBS | HEART RATE: 98 BPM | OXYGEN SATURATION: 100 % | DIASTOLIC BLOOD PRESSURE: 70 MMHG

## 2022-09-07 DIAGNOSIS — Z01.419 WELL WOMAN EXAM WITH ROUTINE GYNECOLOGICAL EXAM: Primary | ICD-10-CM

## 2022-09-07 DIAGNOSIS — B37.31 CANDIDA VAGINITIS: ICD-10-CM

## 2022-09-07 PROCEDURE — 99395 PREV VISIT EST AGE 18-39: CPT | Performed by: FAMILY MEDICINE

## 2022-09-07 RX ORDER — FLUCONAZOLE 150 MG/1
150 TABLET ORAL ONCE
Qty: 1 TABLET | Refills: 0 | Status: SHIPPED | OUTPATIENT
Start: 2022-09-07 | End: 2022-09-07

## 2022-09-07 ASSESSMENT — ENCOUNTER SYMPTOMS: ABDOMINAL PAIN: 0

## 2022-09-07 NOTE — PROGRESS NOTES
[facility]  DATE OF VISIT : 2022    Patient : Ritchie Crowe   Age : 43 y.o.    : 1985   MRN : [id number]   ______________________________________________________________________    Chief Complaint :   Chief Complaint   Patient presents with    Annual Exam     Well Woman Exam/Pap       HPI : Ritchie Crowe is 43 y.o. female who presented to the clinic today for [address] G 3P 3Ab 0. Menarche:13yo  Periods:  regular, every 21days, last 3 days. .   Sexually active : not currently . OCP hx: yes, just a couple months  Last Pap smear >5yrs ago. Previous Pap smears normal.   Fm hx of Breast cancer: no  Fm hx of Ovarian cancer: no  Fm hx of Endometrial cancer: no   Fm hx of Uterine cancer: no  Fm hx of Cervical cancer: no  Doing well. No abdominal or pelvic pain. No dyspareunia. No abnormal vaginal  Discharge or bleeding. No urinary or GI symptoms. Breast: No changes in skin, no lumps, no nipple inversion, bleeding or drainage, no axillary lymphadenopathy on self exam.       The patient was informed about the importance of regular gynecological  examination and pap smear. She was also  informed of the need for yearly mammogram after the age of 36. After age 48 ,a colonoscopy should be scheduled through her primary care physician (PCP). This will help decrease the risk of colon cancer. During the reproductive years, she should take folic acid daily in order to decrease the risk of neural tube defects such as spina bifida. Adequate calcium and vitamin D intake should be added to a healthy diet ,and weight bearing exercise continued daily for improved cardiovascular and bone health. All questions have been answered. I reviewed the patient's past medications, allergies and past medical history during this visit. Past Medical History :    Past Medical History:   Diagnosis Date    Diabetes mellitus ([facility] [address]     Ovarian cyst      History reviewed.  No pertinent surgical history. Social History :  Social History       Tobacco History       Smoking Status  Never      Smokeless Tobacco Use  Never              Alcohol History       Alcohol Use Status  Yes Comment  social              Drug Use       Drug Use Status  Never              Sexual Activity       Sexually Active  Not Asked                     Allergies :   No Known Allergies    Medication List :    Current Outpatient Medications   Medication Sig Dispense Refill    fluconazole (DIFLUCAN) 150 MG tablet Take 1 tablet by mouth once for 1 dose 1 tablet 0    sertraline (ZOLOFT) 50 MG tablet take 1 tablet by mouth once daily 90 tablet 1    metFORMIN (GLUCOPHAGE) 1000 MG tablet Take 1 tablet by mouth 2 times daily (with meals) 180 tablet 1    naproxen (NAPROSYN) 500 MG tablet Take 1 tablet by mouth 2 times daily (with meals) 60 tablet 3    vitamin D3 (CHOLECALCIFEROL) 25 MCG (1000 UT) TABS tablet Take 2 tablets by mouth daily 60 tablet 3    SITagliptin (JANUVIA) 50 MG tablet Take 1 tablet by mouth daily 90 tablet 1    zinc 50 MG CAPS Take 50 mg by mouth daily 30 capsule 3    vitamin C (ASCORBIC ACID) 500 MG tablet Take 1 tablet by mouth daily 30 tablet 3    blood glucose monitor kit and supplies Dispense sufficient amount for indicated testing frequency plus additional to accommodate PRN testing needs. Dispense all needed supplies to include: monitor, strips, lancing device, lancets, control solutions, alcohol swabs. 1 kit 0    dextromethorphan-guaiFENesin (DIABETIC TUSSIN DM)  MG/5ML syrup Take 10 mLs by mouth every 4 hours as needed for Cough (Patient not taking: No sig reported)       No current facility-administered medications for this visit. Review of Systems :  Review of Systems   Gastrointestinal:  Negative for abdominal pain. Endocrine: Negative for polyuria.    Genitourinary:  Negative for dyspareunia, dysuria, frequency, genital sores, hematuria, menstrual problem, pelvic pain, urgency, vaginal bleeding, vaginal discharge and vaginal pain.   ______________________________________________________________________    Physical Exam :    Vitals: /70 (Site: Left Upper Arm, Position: Sitting, Cuff Size: Small Adult)   Pulse 98   Temp 97.2 °F (36.2 °C) (Temporal)   Resp 16   Ht 5' 4\" (1.626 m)   Wt 165 lb (74.8 kg)   LMP 08/23/2022 (Exact Date)   SpO2 100%   BMI 28.32 kg/m²   Physical Exam  Constitutional:       General: She is not in acute distress. Appearance: Normal appearance. She is not ill-appearing. Cardiovascular:      Rate and Rhythm: Normal rate and regular rhythm. Pulses: Normal pulses. Heart sounds: Normal heart sounds. No murmur heard. Pulmonary:      Effort: Pulmonary effort is normal. No respiratory distress. Breath sounds: Normal breath sounds. No wheezing. Chest:   Breasts:     Breasts are symmetrical.      Right: Normal. No swelling, bleeding, inverted nipple, mass, nipple discharge, skin change, tenderness, axillary adenopathy or supraclavicular adenopathy. Left: Normal. No swelling, bleeding, inverted nipple, mass, nipple discharge, skin change, tenderness, axillary adenopathy or supraclavicular adenopathy. Abdominal:      General: Bowel sounds are normal. There is no distension. Palpations: Abdomen is soft. Tenderness: There is no abdominal tenderness. Hernia: There is no hernia in the left inguinal area or right inguinal area. Genitourinary:     Pubic Area: No rash. Labia:         Right: No rash, lesion or injury. Left: No rash, lesion or injury. Vagina: No signs of injury. Vaginal discharge (thick white) present. No tenderness, bleeding, lesions or prolapsed vaginal walls. Cervix: No cervical motion tenderness, discharge, friability or lesion. Uterus: Normal. Not enlarged and not tender. Adnexa: Right adnexa normal and left adnexa normal.        Right: No mass, tenderness or fullness.           Left: No protecting against potentially harmful/life threatening disease. Patient and/or guardian verbalizes understanding and agrees with above counseling, assessment and plan. All questions answered    Additional plan and future considerations:   RTO in 3 months for DM    Return to Office: Return in about 3 months (around 11/24/2022) for Diabetes.     Electronically signed by Maria T Rothman MD on 9/7/2022 at 9:24 AM

## 2022-09-13 LAB
HPV SAMPLE: NORMAL
HPV TYPE 16: NOT DETECTED
HPV TYPE 18: NOT DETECTED
HPV, HIGH RISK OTHER: NOT DETECTED
INTERPRETATION: NORMAL
SOURCE: NORMAL

## 2022-10-10 ENCOUNTER — HOSPITAL ENCOUNTER (EMERGENCY)
Age: 37
Discharge: HOME | End: 2022-10-10
Attending: EMERGENCY MEDICINE
Payer: COMMERCIAL

## 2022-10-10 ENCOUNTER — APPOINTMENT (OUTPATIENT)
Dept: CT IMAGING | Age: 37
End: 2022-10-10
Payer: COMMERCIAL

## 2022-10-10 ENCOUNTER — APPOINTMENT (OUTPATIENT)
Dept: GENERAL RADIOLOGY | Age: 37
End: 2022-10-10
Payer: COMMERCIAL

## 2022-10-10 VITALS
DIASTOLIC BLOOD PRESSURE: 77 MMHG | HEART RATE: 96 BPM | OXYGEN SATURATION: 98 % | BODY MASS INDEX: 26.63 KG/M2 | HEIGHT: 64 IN | WEIGHT: 156 LBS | RESPIRATION RATE: 20 BRPM | SYSTOLIC BLOOD PRESSURE: 112 MMHG | TEMPERATURE: 98.1 F

## 2022-10-10 DIAGNOSIS — H04.123 DRY EYES: Primary | ICD-10-CM

## 2022-10-10 DIAGNOSIS — R73.9 HYPERGLYCEMIA: ICD-10-CM

## 2022-10-10 DIAGNOSIS — R51.9 NONINTRACTABLE HEADACHE, UNSPECIFIED CHRONICITY PATTERN, UNSPECIFIED HEADACHE TYPE: ICD-10-CM

## 2022-10-10 DIAGNOSIS — E11.65 UNCONTROLLED TYPE 2 DIABETES MELLITUS WITH HYPERGLYCEMIA (HCC): ICD-10-CM

## 2022-10-10 LAB
ALBUMIN SERPL-MCNC: 4.4 G/DL (ref 3.5–5.2)
ALP BLD-CCNC: 33 U/L (ref 35–104)
ALT SERPL-CCNC: 20 U/L (ref 0–32)
ANION GAP SERPL CALCULATED.3IONS-SCNC: 12 MMOL/L (ref 7–16)
AST SERPL-CCNC: 20 U/L (ref 0–31)
BACTERIA: ABNORMAL /HPF
BASOPHILS ABSOLUTE: 0.04 E9/L (ref 0–0.2)
BASOPHILS RELATIVE PERCENT: 0.6 % (ref 0–2)
BILIRUB SERPL-MCNC: 0.2 MG/DL (ref 0–1.2)
BILIRUBIN URINE: NEGATIVE
BLOOD, URINE: NEGATIVE
BUN BLDV-MCNC: 12 MG/DL (ref 6–20)
CALCIUM SERPL-MCNC: 9.3 MG/DL (ref 8.6–10.2)
CHLORIDE BLD-SCNC: 102 MMOL/L (ref 98–107)
CHP ED QC CHECK: YES
CLARITY: CLEAR
CO2: 22 MMOL/L (ref 22–29)
COLOR: YELLOW
CREAT SERPL-MCNC: 0.8 MG/DL (ref 0.5–1)
EOSINOPHILS ABSOLUTE: 0.06 E9/L (ref 0.05–0.5)
EOSINOPHILS RELATIVE PERCENT: 0.9 % (ref 0–6)
GFR AFRICAN AMERICAN: >60
GFR NON-AFRICAN AMERICAN: >60 ML/MIN/1.73
GLUCOSE BLD-MCNC: 174 MG/DL
GLUCOSE BLD-MCNC: 178 MG/DL (ref 74–99)
GLUCOSE URINE: >=1000 MG/DL
HCG(URINE) PREGNANCY TEST: NEGATIVE
HCT VFR BLD CALC: 35 % (ref 34–48)
HEMOGLOBIN: 11.2 G/DL (ref 11.5–15.5)
IMMATURE GRANULOCYTES #: 0.01 E9/L
IMMATURE GRANULOCYTES %: 0.2 % (ref 0–5)
KETONES, URINE: NEGATIVE MG/DL
LACTIC ACID: 1.8 MMOL/L (ref 0.5–2.2)
LEUKOCYTE ESTERASE, URINE: NEGATIVE
LIPASE: 45 U/L (ref 13–60)
LYMPHOCYTES ABSOLUTE: 2.09 E9/L (ref 1.5–4)
LYMPHOCYTES RELATIVE PERCENT: 31.9 % (ref 20–42)
MCH RBC QN AUTO: 22.6 PG (ref 26–35)
MCHC RBC AUTO-ENTMCNC: 32 % (ref 32–34.5)
MCV RBC AUTO: 70.7 FL (ref 80–99.9)
METER GLUCOSE: 174 MG/DL (ref 74–99)
MONOCYTES ABSOLUTE: 0.72 E9/L (ref 0.1–0.95)
MONOCYTES RELATIVE PERCENT: 11 % (ref 2–12)
NEUTROPHILS ABSOLUTE: 3.63 E9/L (ref 1.8–7.3)
NEUTROPHILS RELATIVE PERCENT: 55.4 % (ref 43–80)
NITRITE, URINE: NEGATIVE
PDW BLD-RTO: 19.7 FL (ref 11.5–15)
PH UA: 7.5 (ref 5–9)
PLATELET # BLD: 358 E9/L (ref 130–450)
PMV BLD AUTO: 9.4 FL (ref 7–12)
POTASSIUM REFLEX MAGNESIUM: 3.9 MMOL/L (ref 3.5–5)
PROTEIN UA: NEGATIVE MG/DL
RBC # BLD: 4.95 E12/L (ref 3.5–5.5)
RBC UA: ABNORMAL /HPF (ref 0–2)
SODIUM BLD-SCNC: 136 MMOL/L (ref 132–146)
SPECIFIC GRAVITY UA: 1.01 (ref 1–1.03)
TOTAL PROTEIN: 7.9 G/DL (ref 6.4–8.3)
UROBILINOGEN, URINE: 1 E.U./DL
WBC # BLD: 6.6 E9/L (ref 4.5–11.5)
WBC UA: ABNORMAL /HPF (ref 0–5)

## 2022-10-10 PROCEDURE — 87088 URINE BACTERIA CULTURE: CPT

## 2022-10-10 PROCEDURE — 6360000002 HC RX W HCPCS: Performed by: NURSE PRACTITIONER

## 2022-10-10 PROCEDURE — 87077 CULTURE AEROBIC IDENTIFY: CPT

## 2022-10-10 PROCEDURE — 81025 URINE PREGNANCY TEST: CPT

## 2022-10-10 PROCEDURE — 82962 GLUCOSE BLOOD TEST: CPT

## 2022-10-10 PROCEDURE — 83605 ASSAY OF LACTIC ACID: CPT

## 2022-10-10 PROCEDURE — 71045 X-RAY EXAM CHEST 1 VIEW: CPT

## 2022-10-10 PROCEDURE — 81001 URINALYSIS AUTO W/SCOPE: CPT

## 2022-10-10 PROCEDURE — 83690 ASSAY OF LIPASE: CPT

## 2022-10-10 PROCEDURE — 82010 KETONE BODYS QUAN: CPT

## 2022-10-10 PROCEDURE — 36415 COLL VENOUS BLD VENIPUNCTURE: CPT

## 2022-10-10 PROCEDURE — 96374 THER/PROPH/DIAG INJ IV PUSH: CPT

## 2022-10-10 PROCEDURE — 85025 COMPLETE CBC W/AUTO DIFF WBC: CPT

## 2022-10-10 PROCEDURE — 96375 TX/PRO/DX INJ NEW DRUG ADDON: CPT

## 2022-10-10 PROCEDURE — 93005 ELECTROCARDIOGRAM TRACING: CPT | Performed by: NURSE PRACTITIONER

## 2022-10-10 PROCEDURE — 80053 COMPREHEN METABOLIC PANEL: CPT

## 2022-10-10 PROCEDURE — 99285 EMERGENCY DEPT VISIT HI MDM: CPT

## 2022-10-10 PROCEDURE — 70450 CT HEAD/BRAIN W/O DYE: CPT

## 2022-10-10 PROCEDURE — 87186 SC STD MICRODIL/AGAR DIL: CPT

## 2022-10-10 RX ORDER — KETOROLAC TROMETHAMINE 30 MG/ML
30 INJECTION, SOLUTION INTRAMUSCULAR; INTRAVENOUS ONCE
Status: COMPLETED | OUTPATIENT
Start: 2022-10-10 | End: 2022-10-10

## 2022-10-10 RX ORDER — DIPHENHYDRAMINE HYDROCHLORIDE 50 MG/ML
25 INJECTION INTRAMUSCULAR; INTRAVENOUS ONCE
Status: COMPLETED | OUTPATIENT
Start: 2022-10-10 | End: 2022-10-10

## 2022-10-10 RX ORDER — METOCLOPRAMIDE HYDROCHLORIDE 5 MG/ML
5 INJECTION INTRAMUSCULAR; INTRAVENOUS ONCE
Status: COMPLETED | OUTPATIENT
Start: 2022-10-10 | End: 2022-10-10

## 2022-10-10 RX ADMIN — KETOROLAC TROMETHAMINE 30 MG: 30 INJECTION, SOLUTION INTRAMUSCULAR at 21:59

## 2022-10-10 RX ADMIN — DIPHENHYDRAMINE HYDROCHLORIDE 25 MG: 50 INJECTION, SOLUTION INTRAMUSCULAR; INTRAVENOUS at 21:59

## 2022-10-10 RX ADMIN — METOCLOPRAMIDE 5 MG: 5 INJECTION, SOLUTION INTRAMUSCULAR; INTRAVENOUS at 21:59

## 2022-10-10 ASSESSMENT — LIFESTYLE VARIABLES
HOW MANY STANDARD DRINKS CONTAINING ALCOHOL DO YOU HAVE ON A TYPICAL DAY: PATIENT DOES NOT DRINK
HOW OFTEN DO YOU HAVE A DRINK CONTAINING ALCOHOL: NEVER

## 2022-10-10 ASSESSMENT — VISUAL ACUITY
OD: 20/100
OU: 20/70
OS: 20/100

## 2022-10-10 ASSESSMENT — PAIN - FUNCTIONAL ASSESSMENT: PAIN_FUNCTIONAL_ASSESSMENT: 0-10

## 2022-10-10 ASSESSMENT — PAIN DESCRIPTION - LOCATION: LOCATION: HEAD;EYE

## 2022-10-10 ASSESSMENT — PAIN SCALES - GENERAL: PAINLEVEL_OUTOF10: 8

## 2022-10-11 LAB
BETA-HYDROXYBUTYRATE: 0.06 MMOL/L (ref 0.02–0.27)
EKG ATRIAL RATE: 84 BPM
EKG P AXIS: 47 DEGREES
EKG P-R INTERVAL: 166 MS
EKG Q-T INTERVAL: 356 MS
EKG QRS DURATION: 76 MS
EKG QTC CALCULATION (BAZETT): 420 MS
EKG R AXIS: 27 DEGREES
EKG T AXIS: 23 DEGREES
EKG VENTRICULAR RATE: 84 BPM

## 2022-10-11 NOTE — ED PROVIDER NOTES
PATIENT SEEN INDEPENDENTLY BY MLP    EKG: This EKG is signed and interpreted by me.     Rate: 84  Rhythm: Sinus  Interpretation: no acute changes  Comparison: no previous EKG available        ATTENDING PROVIDER ATTESTATION:     Supervising Physician, on-site, available for consultation, non-participatory in the evaluation or care of this patient       Champ Cassidy MD  10/11/22 1841

## 2022-10-11 NOTE — ED PROVIDER NOTES
Independent [facility]      HPI:  10/10/22, Time:  PM EDT         Amber Arguello is a 43 y.o. female presenting to the ED for medication refill eye irritation and intermittent headache. Patient's history physical exam was obtained using the aid of the  via the [facility]  services. Patient states that she has run out of her diabetic medications that she is now staying in a new home she states that she left her medications at the old home she does not even have it glucometer to check her blood sugars. She states that she needs new prescriptions for all of her medications including a glucometer. Patient also states that she has a history of chronic headaches she took some Tylenol at home and her headache has improved. She also states that her eye problem is longstanding and she has a history of chronic eye irritation with dry eyes and sometimes intermittent eye tearing. She states that she has never seen a physician for this she denies any possibility of foreign body in her eyes she denies any foreign body exposure. Patient denies any vision changes including blurred vision double vision. Patient denies any chest pain shortness of breath numbness tingling weakness to the bilateral lower extremities she denies any nausea vomiting or diarrhea. ROS:   Pertinent positives and negatives are stated within HPI, all other systems reviewed and are negative.  --------------------------------------------- PAST HISTORY ---------------------------------------------  Past Medical History:  has a past medical history of Diabetes mellitus ([facility] [address] and Ovarian cyst.    Past Surgical History:  has no past surgical history on file. Social History:  reports that she has never smoked. She has never used smokeless tobacco. She reports current alcohol use. She reports that she does not use drugs. Family History: family history includes Diabetes in her brother and mother.      The patients home medications have been reviewed. Allergies: Patient has no known allergies. ---------------------------------------------------PHYSICAL EXAM--------------------------------------    Constitutional/General: Alert and oriented x3, well appearing, non toxic in NAD  Head: Normocephalic and atraumatic  Eyes: PERRL, EOMI  Mouth: Oropharynx clear, handling secretions, no trismus  Neck: Supple, full ROM, non tender to palpation in the midline, no stridor, no crepitus, no meningeal signs  Pulmonary: Lungs clear to auscultation bilaterally, no wheezes, rales, or rhonchi. Not in respiratory distress  Cardiovascular:  Regular rate. Regular rhythm. No murmurs, gallops, or rubs. 2+ distal pulses  Chest: no chest wall tenderness  Abdomen: Soft. Non tender. Non distended. +BS. No rebound, guarding, or rigidity. No pulsatile masses appreciated. Musculoskeletal: Moves all extremities x 4. Warm and well perfused, no clubbing, cyanosis, or edema. Capillary refill <3 seconds  Skin: warm and dry. No rashes. Neurologic: GCS 15, CN 2-12 grossly intact, no focal deficits, symmetric strength 5/5 in the upper and lower extremities bilaterally  Psych: Normal Affect    -------------------------------------------------- RESULTS -------------------------------------------------  I have personally reviewed all laboratory and imaging results for this patient. Results are listed below.      LABS:  Results for orders placed or performed during the hospital encounter of 10/10/22   Culture, Urine    Specimen: Urine, clean catch   Result Value Ref Range    Organism Streptococcus species (A)     Urine Culture, Routine       75,000 CFU/ml  Identification and sensitivity to follow     CBC with Auto Differential   Result Value Ref Range    WBC 6.6 4.5 - 11.5 E9/L    RBC 4.95 3.50 - 5.50 E12/L    Hemoglobin 11.2 (L) 11.5 - 15.5 g/dL    Hematocrit 35.0 34.0 - 48.0 %    MCV 70.7 (L) 80.0 - 99.9 fL    MCH 22.6 (L) 26.0 - 35.0 pg    MCHC 32.0 32.0 - 34.5 %    RDW 19.7 (H) 11.5 - 15.0 fL    Platelets 713 933 - 414 E9/L    MPV 9.4 7.0 - 12.0 fL    Neutrophils % 55.4 43.0 - 80.0 %    Immature Granulocytes % 0.2 0.0 - 5.0 %    Lymphocytes % 31.9 20.0 - 42.0 %    Monocytes % 11.0 2.0 - 12.0 %    Eosinophils % 0.9 0.0 - 6.0 %    Basophils % 0.6 0.0 - 2.0 %    Neutrophils Absolute 3.63 1.80 - 7.30 E9/L    Immature Granulocytes # 0.01 E9/L    Lymphocytes Absolute 2.09 1.50 - 4.00 E9/L    Monocytes Absolute 0.72 0.10 - 0.95 E9/L    Eosinophils Absolute 0.06 0.05 - 0.50 E9/L    Basophils Absolute 0.04 0.00 - 0.20 E9/L   Comprehensive Metabolic Panel w/ Reflex to MG   Result Value Ref Range    Sodium 136 132 - 146 mmol/L    Potassium reflex Magnesium 3.9 3.5 - 5.0 mmol/L    Chloride 102 98 - 107 mmol/L    CO2 22 22 - 29 mmol/L    Anion Gap 12 7 - 16 mmol/L    Glucose 178 (H) 74 - 99 mg/dL    BUN 12 6 - 20 mg/dL    Creatinine 0.8 0.5 - 1.0 mg/dL    GFR Non-African American >60 >=60 mL/min/1.73    GFR African American >60     Calcium 9.3 8.6 - 10.2 mg/dL    Total Protein 7.9 6.4 - 8.3 g/dL    Albumin 4.4 3.5 - 5.2 g/dL    Total Bilirubin 0.2 0.0 - 1.2 mg/dL    Alkaline Phosphatase 33 (L) 35 - 104 U/L    ALT 20 0 - 32 U/L    AST 20 0 - 31 U/L   Lactic Acid   Result Value Ref Range    Lactic Acid 1.8 0.5 - 2.2 mmol/L   Lipase   Result Value Ref Range    Lipase 45 13 - 60 U/L   Beta-Hydroxybutyrate   Result Value Ref Range    Beta-Hydroxybutyrate 0.06 0.02 - 0.27 mmol/L   Urinalysis with Microscopic   Result Value Ref Range    Color, UA Yellow Straw/Yellow    Clarity, UA Clear Clear    Glucose, Ur >=1000 (A) Negative mg/dL    Bilirubin Urine Negative Negative    Ketones, Urine Negative Negative mg/dL    Specific Gravity, UA 1.015 1.005 - 1.030    Blood, Urine Negative Negative    pH, UA 7.5 5.0 - 9.0    Protein, UA Negative Negative mg/dL    Urobilinogen, Urine 1.0 <2.0 E.U./dL    Nitrite, Urine Negative Negative    Leukocyte Esterase, Urine Negative Negative    WBC, UA 0-1 0 - 5 /HPF    RBC, UA NONE 0 - 2 /HPF    Bacteria, UA NONE SEEN None Seen /HPF   Pregnancy, Urine   Result Value Ref Range    HCG(Urine) Pregnancy Test NEGATIVE NEGATIVE   POCT glucose   Result Value Ref Range    Glucose 174 mg/dL    QC OK? yes    POCT Glucose   Result Value Ref Range    Meter Glucose 174 (H) 74 - 99 mg/dL   EKG 12 Lead   Result Value Ref Range    Ventricular Rate 84 BPM    Atrial Rate 84 BPM    P-R Interval 166 ms    QRS Duration 76 ms    Q-T Interval 356 ms    QTc Calculation (Bazett) 420 ms    P Axis 47 degrees    R Axis 27 degrees    T Axis 23 degrees       RADIOLOGY:  Interpreted by Radiologist.  CT Head WO Contrast   Final Result   No acute intracranial abnormality. XR CHEST PORTABLE   Final Result   No acute cardiopulmonary process. Please see separate note from ED attending Dr. Ki Alarcon for his EKG interpretation of the patient.      ------------------------- NURSING NOTES AND VITALS REVIEWED ---------------------------   The nursing notes within the ED encounter and vital signs as below have been reviewed by myself. /77   Pulse 96   Temp 98.1 °F (36.7 °C) (Oral)   Resp 20   Ht 5' 4\" (1.626 m)   Wt 156 lb (70.8 kg)   SpO2 98%   BMI 26.78 kg/m²   Oxygen Saturation Interpretation: Normal    The patients available past medical records and past encounters were reviewed. ------------------------------ ED COURSE/MEDICAL DECISION MAKING----------------------  Medications   ketorolac (TORADOL) injection 30 mg (30 mg IntraVENous Given 10/10/22 2159)   diphenhydrAMINE (BENADRYL) injection 25 mg (25 mg IntraVENous Given 10/10/22 2159)   metoclopramide (REGLAN) injection 5 mg (5 mg IntraVENous Given 10/10/22 2159)             Medical Decision Making: At this time will be managed for headache as well as dry eyes. She given prescription for her diabetic medications including a glucometer.   Patient also educated to follow-up with her primary care physician for further evaluation and treatment. Patient was educated on over-the-counter medication for her dry eyes including artificial tears. Also Tylenol and ibuprofen for her headaches. Patient verbalizes understanding patient was updated on her lab results as well as CT and x-ray findings which were negative for acute process. Patient agreeable with close outpatient follow-up. At this time the patient is without objective evidence of an acute process requiring hospitalization or inpatient management. They have remained hemodynamically stable throughout their entire ED visit and are stable for discharge with outpatient follow-up. The plan has been discussed in detail and they are aware of the specific conditions for emergent return, as well as the importance of follow-up. Re-Evaluations:             Re-evaluation. Patients symptoms are improving      Consultations:             none    Critical Care: none        This patient's ED course included: a personal history and physicial eaxmination    This patient has remained hemodynamically stable and improved during their ED course. Counseling: The emergency provider has spoken with the patient and discussed todays results, in addition to providing specific details for the plan of care and counseling regarding the diagnosis and prognosis. Questions are answered at this time and they are agreeable with the plan.       --------------------------------- IMPRESSION AND DISPOSITION ---------------------------------    IMPRESSION  1. Dry eyes    2. Nonintractable headache, unspecified chronicity pattern, unspecified headache type    3. Hyperglycemia    4. Uncontrolled type 2 diabetes mellitus with hyperglycemia ([address]        DISPOSITION  Disposition: Discharge to home  Patient condition is good        NOTE: This report was transcribed using voice recognition software.  Every effort was made to ensure accuracy; however, inadvertent computerized transcription errors may be present         Soco Jones, APRN - CNP  10/12/22 5239

## 2022-10-12 ENCOUNTER — OFFICE VISIT (OUTPATIENT)
Dept: FAMILY MEDICINE CLINIC | Age: 37
End: 2022-10-12
Payer: COMMERCIAL

## 2022-10-12 VITALS
OXYGEN SATURATION: 100 % | BODY MASS INDEX: 27.08 KG/M2 | HEIGHT: 64 IN | DIASTOLIC BLOOD PRESSURE: 68 MMHG | HEART RATE: 86 BPM | TEMPERATURE: 97.2 F | WEIGHT: 158.6 LBS | RESPIRATION RATE: 16 BRPM | SYSTOLIC BLOOD PRESSURE: 108 MMHG

## 2022-10-12 DIAGNOSIS — E11.65 UNCONTROLLED TYPE 2 DIABETES MELLITUS WITH HYPERGLYCEMIA (HCC): Primary | ICD-10-CM

## 2022-10-12 PROCEDURE — G8427 DOCREV CUR MEDS BY ELIG CLIN: HCPCS | Performed by: FAMILY MEDICINE

## 2022-10-12 PROCEDURE — 3051F HG A1C>EQUAL 7.0%<8.0%: CPT | Performed by: FAMILY MEDICINE

## 2022-10-12 PROCEDURE — G8419 CALC BMI OUT NRM PARAM NOF/U: HCPCS | Performed by: FAMILY MEDICINE

## 2022-10-12 PROCEDURE — 2022F DILAT RTA XM EVC RTNOPTHY: CPT | Performed by: FAMILY MEDICINE

## 2022-10-12 PROCEDURE — G8484 FLU IMMUNIZE NO ADMIN: HCPCS | Performed by: FAMILY MEDICINE

## 2022-10-12 PROCEDURE — 1036F TOBACCO NON-USER: CPT | Performed by: FAMILY MEDICINE

## 2022-10-12 PROCEDURE — 99213 OFFICE O/P EST LOW 20 MIN: CPT | Performed by: FAMILY MEDICINE

## 2022-10-12 ASSESSMENT — ENCOUNTER SYMPTOMS
COUGH: 0
VOMITING: 0
CONSTIPATION: 0
ABDOMINAL PAIN: 0
SHORTNESS OF BREATH: 0
WHEEZING: 0
DIARRHEA: 0
NAUSEA: 0
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
## 2022-10-13 LAB
ORGANISM: ABNORMAL
URINE CULTURE, ROUTINE: ABNORMAL

## 2022-12-13 ENCOUNTER — OFFICE VISIT (OUTPATIENT)
Dept: FAMILY MEDICINE CLINIC | Age: 37
End: 2022-12-13
Payer: COMMERCIAL

## 2022-12-13 VITALS
OXYGEN SATURATION: 100 % | TEMPERATURE: 97.3 F | HEIGHT: 64 IN | WEIGHT: 161.7 LBS | HEART RATE: 51 BPM | BODY MASS INDEX: 27.61 KG/M2 | DIASTOLIC BLOOD PRESSURE: 64 MMHG | SYSTOLIC BLOOD PRESSURE: 104 MMHG | RESPIRATION RATE: 16 BRPM

## 2022-12-13 DIAGNOSIS — Z20.2 STD EXPOSURE: ICD-10-CM

## 2022-12-13 DIAGNOSIS — B96.89 BACTERIAL VAGINOSIS: ICD-10-CM

## 2022-12-13 DIAGNOSIS — E11.9 TYPE 2 DIABETES MELLITUS WITHOUT COMPLICATION, WITHOUT LONG-TERM CURRENT USE OF INSULIN (HCC): ICD-10-CM

## 2022-12-13 DIAGNOSIS — N76.0 BACTERIAL VAGINOSIS: ICD-10-CM

## 2022-12-13 DIAGNOSIS — N89.8 VAGINAL DISCHARGE: Primary | ICD-10-CM

## 2022-12-13 DIAGNOSIS — N89.8 VAGINAL DISCHARGE: ICD-10-CM

## 2022-12-13 LAB — HBA1C MFR BLD: 7.3 %

## 2022-12-13 PROCEDURE — 2022F DILAT RTA XM EVC RTNOPTHY: CPT | Performed by: FAMILY MEDICINE

## 2022-12-13 PROCEDURE — 87210 SMEAR WET MOUNT SALINE/INK: CPT | Performed by: FAMILY MEDICINE

## 2022-12-13 PROCEDURE — 3051F HG A1C>EQUAL 7.0%<8.0%: CPT | Performed by: FAMILY MEDICINE

## 2022-12-13 PROCEDURE — G8484 FLU IMMUNIZE NO ADMIN: HCPCS | Performed by: FAMILY MEDICINE

## 2022-12-13 PROCEDURE — 1036F TOBACCO NON-USER: CPT | Performed by: FAMILY MEDICINE

## 2022-12-13 PROCEDURE — 99214 OFFICE O/P EST MOD 30 MIN: CPT | Performed by: FAMILY MEDICINE

## 2022-12-13 PROCEDURE — 83036 HEMOGLOBIN GLYCOSYLATED A1C: CPT | Performed by: FAMILY MEDICINE

## 2022-12-13 PROCEDURE — 36415 COLL VENOUS BLD VENIPUNCTURE: CPT | Performed by: FAMILY MEDICINE

## 2022-12-13 PROCEDURE — G8419 CALC BMI OUT NRM PARAM NOF/U: HCPCS | Performed by: FAMILY MEDICINE

## 2022-12-13 PROCEDURE — G8427 DOCREV CUR MEDS BY ELIG CLIN: HCPCS | Performed by: FAMILY MEDICINE

## 2022-12-13 RX ORDER — ISOPROPYL ALCOHOL 0.75 G/1
SWAB TOPICAL
COMMUNITY
Start: 2022-10-12

## 2022-12-13 RX ORDER — BLOOD SUGAR DIAGNOSTIC
STRIP MISCELLANEOUS
COMMUNITY
Start: 2022-10-12

## 2022-12-13 RX ORDER — METRONIDAZOLE 500 MG/1
500 TABLET ORAL 2 TIMES DAILY
Qty: 14 TABLET | Refills: 0 | Status: SHIPPED | OUTPATIENT
Start: 2022-12-13 | End: 2022-12-20

## 2022-12-13 RX ORDER — LANCETS 33 GAUGE
EACH MISCELLANEOUS
COMMUNITY
Start: 2022-10-12

## 2022-12-13 ASSESSMENT — ENCOUNTER SYMPTOMS
NAUSEA: 0
SHORTNESS OF BREATH: 0
WHEEZING: 0
COUGH: 0
ABDOMINAL PAIN: 0
VOMITING: 0
DIARRHEA: 0
CONSTIPATION: 0

## 2022-12-13 NOTE — PROGRESS NOTES
[facility]  DATE OF VISIT : 2022    Patient : Von Dacosta   Age : 42 y.o.  : 1985   MRN : [id number]   ______________________________________________________________________    Chief Complaint :   Chief Complaint   Patient presents with    Abdominal Pain     Patient state she has been having lower abdominal pain and inflammation near her vaginal area in her lower abdomen. Denies any burning when urinating and did not want to tell me if she has discharge. Has been taking Ibuprofen and helps some with pain.+       HPI : Von Dacosta is 42 y.o. female who presented to the clinic today for office visit. NIDDM: Januvia 50mg and Metformin 1000mg. Last A1C 7.4 (22). Has been compliant with meds, BG at home usually <170. Abdominal pain: started about 1 week ago. Crampy abdominal pain on the right side of the lower abdomen. Denies any nausea, vomiting, diarrhea, constipation, BRBPR or melena. Denies any dysuria, hematuria, frequency or urgency but does note her urine has been darker but without foul odor. Does endorse becoming sexually active recently with a new partner and has not been using barrier contraception. She does endorse some vaginal discharge with likely foul odor but denies any dyspareunia. LMP one week ago, light flow, came early for her. Also notes that she has been using vaginal soaps. I reviewed the patient's past medications, allergies and past medical history during this visit. Past Medical History :    Past Medical History:   Diagnosis Date    Diabetes mellitus ([address]     Ovarian cyst      No past surgical history on file.     Social History :  Social History       Tobacco History       Smoking Status  Never      Smokeless Tobacco Use  Never              Alcohol History       Alcohol Use Status  Yes Comment  social              Drug Use       Drug Use Status  Never              Sexual Activity       Sexually Active  Not Asked Allergies :   No Known Allergies    Medication List :    Current Outpatient Medications   Medication Sig Dispense Refill    metroNIDAZOLE (FLAGYL) 500 MG tablet Take 1 tablet by mouth 2 times daily for 7 days 14 tablet 0    blood glucose monitor kit and supplies Dispense sufficient amount for indicated testing frequency plus additional to accommodate PRN testing needs. Dispense all needed supplies to include: monitor, strips, lancing device, lancets, control solutions, alcohol swabs. 1 kit 0    metFORMIN (GLUCOPHAGE) 1000 MG tablet Take 1 tablet by mouth 2 times daily (with meals) 60 tablet 0    SITagliptin (JANUVIA) 50 MG tablet Take 1 tablet by mouth daily 30 tablet 0    Alcohol Swabs (B-D SINGLE USE SWABS REGULAR) PADS use 1 once daily      ONETOUCH VERIO strip use 1 TEST STRIP to TEST BLOOD SUGAR once daily      Lancets (ONETOUCH DELICA PLUS [id number]) MISC use 1 LANCET to TEST BLOOD SUGAR once daily       No current facility-administered medications for this visit. Review of Systems :  Review of Systems   Constitutional:  Negative for chills, fatigue and fever. Respiratory:  Negative for cough, shortness of breath and wheezing. Cardiovascular:  Negative for chest pain, palpitations and leg swelling. Gastrointestinal:  Negative for abdominal pain, constipation, diarrhea, nausea and vomiting. Endocrine: Negative for polydipsia and polyuria. Genitourinary:  Positive for vaginal discharge. Negative for dyspareunia, dysuria, frequency, genital sores, hematuria, urgency, vaginal bleeding and vaginal pain.    Neurological:  Negative for dizziness, weakness, light-headedness, numbness and headaches.   ______________________________________________________________________    Physical Exam :    Vitals: /64 (Site: Left Upper Arm, Position: Sitting, Cuff Size: Large Adult)   Pulse 51   Temp 97.3 °F (36.3 °C) (Temporal)   Resp 16   Ht 5' 4\" (1.626 m)   Wt 161 lb 11.2 oz (73.3 kg) LMP 12/06/2022 (Approximate)   SpO2 100%   BMI 27.76 kg/m²   Physical Exam  Vitals reviewed. Constitutional:       General: She is not in acute distress. Appearance: Normal appearance. She is not ill-appearing. Cardiovascular:      Rate and Rhythm: Normal rate and regular rhythm. Pulses: Normal pulses. Heart sounds: Normal heart sounds. No murmur heard. Pulmonary:      Effort: Pulmonary effort is normal. No respiratory distress. Breath sounds: Normal breath sounds. No wheezing. Abdominal:      General: Bowel sounds are normal. There is no distension. Palpations: Abdomen is soft. Tenderness: There is no abdominal tenderness. Hernia: There is no hernia in the left inguinal area or right inguinal area. Genitourinary:     Pubic Area: No rash. Labia:         Right: No rash, lesion or injury. Left: No rash, lesion or injury. Vagina: No signs of injury. Vaginal discharge present. No tenderness, bleeding, lesions or prolapsed vaginal walls. Uterus: Normal. Not enlarged and not tender. Adnexa: Right adnexa normal and left adnexa normal.        Right: No mass, tenderness or fullness. Left: No mass, tenderness or fullness. Musculoskeletal:      Right lower leg: No edema. Left lower leg: No edema. Lymphadenopathy:      Lower Body: No right inguinal adenopathy. No left inguinal adenopathy. Neurological:      Mental Status: She is alert.         ___________________    Assessment & Plan :    1. Type 2 diabetes mellitus without complication, without long-term current use of insulin ([facility])  -A1c today 7.3, A1c goal below 7.  -Continue management with metformin 1000 mg twice daily and Januvia 50 mg daily  -Continue lifestyle and dietary modifications  - POCT glycosylated hemoglobin (Hb A1C)    2. Bacterial vaginosis  3.  Vaginal discharge  -POCT wet prep did show clue cells as well as abundance of WBCs but there is no hyphae, pseudohyphae or flagellated organisms.  - C.TRACHOMATIS Everlyn Stain; Future  - WET PREP, GENITAL; Future  - POCT Wet Prep  - metroNIDAZOLE (FLAGYL) 500 MG tablet; Take 1 tablet by mouth 2 times daily for 7 days  Dispense: 14 tablet; Refill: 0    4. STD exposure  - Elvira Oconnor; Future  - HIV Screen; Future  - Hepatitis Panel, Acute; Future  - RPR; Future    Educational materials and/or home exercises printed for patient's review and were included in patient instructions on his/her After Visit Summary and given to patient at the end of visit. Counseled regarding above diagnosis, including possible risks and complications,  especially if left uncontrolled. Counseled regarding the possible side effects, risks, benefits and alternatives to treatment; patient and/or guardian verbalizes understanding, agrees, feels comfortable with and wishes to proceed with above treatment plan. Advised patient to call with any new medication issues, and read all Rx info from pharmacy to assure aware of all possible risks and side effects of medication before taking. Reviewed age and gender appropriate health screening exams and vaccinations. Advised patient regarding importance of keeping up with recommended health maintenance and to schedule as soon as possible if overdue, as this is important in assessing for undiagnosed pathology, especially cancer, as well as protecting against potentially harmful/life threatening disease. Patient and/or guardian verbalizes understanding and agrees with above counseling, assessment and plan. All questions answered    Additional plan and future considerations:   RTO in 3 months for diabetic follow-up    Return to Office: Return in about 3 months (around 3/13/2023) for Diabetes, POCT A1C.     Electronically signed by Cachorro Talavera MD on 12/13/2022 at 3:23 PM

## 2022-12-19 LAB — CULTURE, TRICHOMONAS: NORMAL

## 2023-01-07 ENCOUNTER — HOSPITAL ENCOUNTER (EMERGENCY)
Age: 38
Discharge: HOME | End: 2023-01-07

## 2023-01-07 VITALS
DIASTOLIC BLOOD PRESSURE: 49 MMHG | TEMPERATURE: 98.6 F | HEART RATE: 69 BPM | OXYGEN SATURATION: 97 % | BODY MASS INDEX: 27.46 KG/M2 | RESPIRATION RATE: 16 BRPM | WEIGHT: 160 LBS | SYSTOLIC BLOOD PRESSURE: 115 MMHG

## 2023-01-07 ASSESSMENT — LIFESTYLE VARIABLES
HOW OFTEN DO YOU HAVE A DRINK CONTAINING ALCOHOL: NEVER
HOW MANY STANDARD DRINKS CONTAINING ALCOHOL DO YOU HAVE ON A TYPICAL DAY: PATIENT DOES NOT DRINK

## 2023-01-07 ASSESSMENT — PAIN SCALES - GENERAL: PAINLEVEL_OUTOF10: 5

## 2023-01-07 ASSESSMENT — PAIN DESCRIPTION - FREQUENCY: FREQUENCY: CONTINUOUS

## 2023-01-07 ASSESSMENT — PAIN DESCRIPTION - PAIN TYPE: TYPE: ACUTE PAIN

## 2023-01-07 ASSESSMENT — PAIN DESCRIPTION - DESCRIPTORS: DESCRIPTORS: DISCOMFORT;ACHING

## 2023-01-07 ASSESSMENT — PAIN DESCRIPTION - LOCATION: LOCATION: GENERALIZED

## 2023-01-07 ASSESSMENT — PAIN - FUNCTIONAL ASSESSMENT: PAIN_FUNCTIONAL_ASSESSMENT: 0-10

## 2023-01-07 ASSESSMENT — PAIN DESCRIPTION - ONSET: ONSET: ON-GOING

## 2023-01-08 ENCOUNTER — HOSPITAL ENCOUNTER (EMERGENCY)
Age: 38
Discharge: HOME | End: 2023-01-08
Attending: EMERGENCY MEDICINE
Payer: COMMERCIAL

## 2023-01-08 VITALS
SYSTOLIC BLOOD PRESSURE: 126 MMHG | RESPIRATION RATE: 16 BRPM | TEMPERATURE: 97.9 F | OXYGEN SATURATION: 98 % | HEART RATE: 85 BPM | DIASTOLIC BLOOD PRESSURE: 76 MMHG

## 2023-01-08 DIAGNOSIS — J06.9 VIRAL URI: Primary | ICD-10-CM

## 2023-01-08 LAB
INFLUENZA A: NOT DETECTED
INFLUENZA B: NOT DETECTED
SARS-COV-2 RNA, RT PCR: NOT DETECTED

## 2023-01-08 PROCEDURE — 87636 SARSCOV2 & INF A&B AMP PRB: CPT

## 2023-01-08 PROCEDURE — 99283 EMERGENCY DEPT VISIT LOW MDM: CPT

## 2023-01-08 NOTE — Clinical Note
Kavita Rivera was seen and treated in our emergency department on 1/8/2023. She may return to work on 01/09/2023. If you have any questions or concerns, please don't hesitate to call.       Rip Myers MD

## 2023-01-09 NOTE — ED PROVIDER NOTES
[address] [facility] [address] ENCOUNTER        Pt Name: Ginny Garcia  MRN: [id number]  [place] 1985  Date of evaluation: 1/8/2023  Provider: Antonette Velazquez MD  PCP: Azucena Humphries MD  Note Started: 3:24 AM EST 1/9/23    CHIEF COMPLAINT       Chief Complaint   Patient presents with    Nasal Congestion     HEADACHE AND RUNNY NOSE       HISTORY OF PRESENT ILLNESS: 1 or more Elements   History From: Patient    Limitations to history : None    Aislinn Carranza is a 42 y.o. female who presents with nasal congestion, flulike symptoms including cough, generalized body aches. Has been ongoing for the past 3 to 4 days, persistent but not worsening. Describes symptoms mild in severity with no alleviating or exacerbating factors. Denies any fever, chills, n/v, headache, dizziness, vision changes, neck tenderness or stiffness, weakness, cp, palpitations, leg swelling/tenderness, sob, cough, abd pain, dysuria, hematuria, diarrhea, constipation, bloody stools. Nursing Notes were all reviewed and agreed with or any disagreements were addressed in the HPI.    ROS:   Pertinent positives and negatives are stated within HPI, all other systems reviewed and are negative.    --------------------------------------------- PAST HISTORY ---------------------------------------------  Past Medical History:  has a past medical history of Diabetes mellitus ([facility] [address] and Ovarian cyst.    Past Surgical History:  has no past surgical history on file. Social History:  reports that she has never smoked. She has never used smokeless tobacco. She reports current alcohol use. She reports that she does not use drugs. Family History: family history includes Diabetes in her brother and mother. The patients home medications have been reviewed. Allergies: Patient has no known allergies.     ---------------------------------------------------PHYSICAL EXAM--------------------------------------    Constitutional/General: Alert and oriented x3, well appearing, non toxic in NAD  Head: Normocephalic and atraumatic  Mouth: Oropharynx clear, handling secretions, no trismus  Neck: Supple, full ROM,  Pulmonary: Lungs clear to auscultation bilaterally, no wheezes, rales, or rhonchi. Not in respiratory distress  Cardiovascular:  Regular rate. Regular rhythm. No murmurs  Chest: no chest wall tenderness  Abdomen: Soft. Non tender. Non distended. No rebound, guarding, or rigidity. No pulsatile masses appreciated. Musculoskeletal: Moves all extremities x 4. Neurovascularly intact. Warm and well perfused, no clubbing, cyanosis, or edema. Compartments are soft and compressible. Capillary refill <3 seconds. Skin: warm and dry. No rashes. Neurologic: GCS 15, no gross focal neurologic deficits  Psych: Normal Affect    -------------------------------------------------- RESULTS -------------------------------------------------  I have personally reviewed all laboratory and imaging results for this patient. Results are listed below. LABS:  Results for orders placed or performed during the hospital encounter of 01/08/23   COVID-19 & Influenza Combo    Specimen: Nasopharyngeal Swab   Result Value Ref Range    SARS-CoV-2 RNA, RT PCR NOT DETECTED NOT DETECTED    INFLUENZA A NOT DETECTED NOT DETECTED    INFLUENZA B NOT DETECTED NOT DETECTED       RADIOLOGY:     No results found. Procedures:      ------------------------- NURSING NOTES AND VITALS REVIEWED ---------------------------   The nursing notes within the ED encounter and vital signs as below have been reviewed by myself and ED attending.   /76   Pulse 85   Temp 97.9 °F (36.6 °C) (Oral)   Resp 16   SpO2 98%   Oxygen Saturation Interpretation: Normal    The patients available past medical records and past encounters were reviewed by myself and ED attending        ------------------------------ ED COURSE/MEDICAL DECISION MAKING----------------------    Medical Decision Making/Differential Diagnosis:    CC/HPI Summary, Pertinent Physical Exam Findings, Social Determinants of health, Records Reviewed, DDx, testing done/not done, ED Course, Reassessment, disposition considerations/shared decision making with patient, consults, disposition:      Medical Decision Making/ED COURSE:    Chronic Conditions affecting care:    has a past medical history of Diabetes mellitus ([address] and Ovarian cyst.        42 y.o. female who presents with nasal congestion, flulike symptoms including cough, generalized body aches. Vital signs /76   Pulse 85   Temp 97.9 °F (36.6 °C) (Oral)   Resp 16   SpO2 98%  While in the ED patient was hemodynamically stable, afebrile, nontoxic-appearing, in no respiratory distress. Patient is overall well-appearing on examination with stable vital signs and good oxygen saturations. Lungs are clear bilaterally. No evidence of respiratory distress. Jestine Courts COVID-19 and influenza swabs are negative. Symptoms likely represent a viral URI. Sshe was advised to continue supportive care measures and ED return precautions discussed. Pt will be d/c and will follow up with her PCP. She is educated on signs and symptoms that require emergent evaluation. Pt is advised to return to the ED if her symptoms change or worsen. If her pain persists, pt may need further evaluation. Pt is agreeable to plan and all questions have been answered at this time. Counseling: The emergency provider has spoken with the patient and discussed todays results, in addition to providing specific details for the plan of care and counseling regarding the diagnosis and prognosis. Questions are answered at this time and they are agreeable with the plan.          Medications - No data to display    Discharge Medication List as of 1/8/2023  4:50 AM            Discussion with Other Profesionals : None    Social Determinants : None    Records Reviewed : None      CONSULTS: none       --------------------------------- IMPRESSION AND DISPOSITION ---------------------------------    IMPRESSION  1. Viral URI        DISPOSITION  Disposition: Discharge to home  Patient condition is stable        NOTE: This report was transcribed using voice recognition software.  Every effort was made to ensure accuracy; however, inadvertent computerized transcription errors may be present         Marva Kaufman MD  Resident  01/09/23 5945

## 2023-01-10 ENCOUNTER — TELEPHONE (OUTPATIENT)
Dept: FAMILY MEDICINE CLINIC | Age: 38
End: 2023-01-10

## 2023-01-10 NOTE — TELEPHONE ENCOUNTER
Patient called and states that she has been getting frequent acid reflux and wants to know if she can have something prescribed for her acid reflux since over the counter things don't really help.     Thanks so much :)

## 2023-01-11 DIAGNOSIS — K21.9 GASTROESOPHAGEAL REFLUX DISEASE, UNSPECIFIED WHETHER ESOPHAGITIS PRESENT: Primary | ICD-10-CM

## 2023-01-11 RX ORDER — PANTOPRAZOLE SODIUM 40 MG/1
40 TABLET, DELAYED RELEASE ORAL
Qty: 30 TABLET | Refills: 0 | Status: SHIPPED | OUTPATIENT
Start: 2023-01-11

## 2023-03-13 ENCOUNTER — TELEPHONE (OUTPATIENT)
Age: 38
End: 2023-03-13

## 2023-03-13 NOTE — TELEPHONE ENCOUNTER
Patient called asking for medication for her vaginal irritation and wanted doctor to prescribe her something but advised her that she has to come in for an appt and that we cannot prescribe anything over the phone without her coming in to be seen. States she will call me back to schedule an appt.

## 2023-03-17 ENCOUNTER — HOSPITAL ENCOUNTER (EMERGENCY)
Age: 38
Discharge: HOME | End: 2023-03-17
Payer: COMMERCIAL

## 2023-03-17 VITALS
HEART RATE: 88 BPM | OXYGEN SATURATION: 98 % | SYSTOLIC BLOOD PRESSURE: 133 MMHG | DIASTOLIC BLOOD PRESSURE: 97 MMHG | RESPIRATION RATE: 18 BRPM | TEMPERATURE: 97.2 F

## 2023-03-17 DIAGNOSIS — N89.8 VAGINAL DISCHARGE: Primary | ICD-10-CM

## 2023-03-17 LAB
BILIRUB UR QL STRIP: NEGATIVE
CLARITY UR: CLEAR
CLUE CELLS VAG QL WET PREP: NORMAL
COLOR UR: YELLOW
GLUCOSE UR STRIP-MCNC: >=1000 MG/DL
HCG, URINE, POC: NEGATIVE
HGB UR QL STRIP: NEGATIVE
KETONES UR STRIP-MCNC: NEGATIVE MG/DL
LEUKOCYTE ESTERASE UR QL STRIP: NEGATIVE
Lab: NORMAL
NEGATIVE QC PASS/FAIL: NORMAL
NITRITE UR QL STRIP: NEGATIVE
PH UR STRIP: 6.5 [PH] (ref 5–9)
POSITIVE QC PASS/FAIL: NORMAL
PROT UR STRIP-MCNC: NEGATIVE MG/DL
SP GR UR STRIP: 1.02 (ref 1–1.03)
SPECIMEN SOURCE FLD: NORMAL
SPECIMEN SOURCE: NORMAL
T VAGINALIS VAG QL WET PREP: NORMAL
UROBILINOGEN UR STRIP-ACNC: 0.2 E.U./DL
YEAST VAG QL WET PREP: NORMAL

## 2023-03-17 PROCEDURE — 87088 URINE BACTERIA CULTURE: CPT

## 2023-03-17 PROCEDURE — 99283 EMERGENCY DEPT VISIT LOW MDM: CPT

## 2023-03-17 PROCEDURE — 87210 SMEAR WET MOUNT SALINE/INK: CPT

## 2023-03-17 PROCEDURE — 87491 CHLMYD TRACH DNA AMP PROBE: CPT

## 2023-03-17 PROCEDURE — 87591 N.GONORRHOEAE DNA AMP PROB: CPT

## 2023-03-17 PROCEDURE — 81003 URINALYSIS AUTO W/O SCOPE: CPT

## 2023-03-17 NOTE — ED NOTES
Department of Emergency Medicine    FIRST PROVIDER TRIAGE NOTE             Independent MLP           3/17/23  12:28 PM EDT    Date of Encounter: 3/17/23   MRN: [id number]    Vitals:    03/17/23 1209 03/17/23 1226   BP:  (!) 117/90   Pulse: 99    Resp: 17    Temp: 97.2 °F (36.2 °C)    SpO2: 100%       HPI: Yumi Villanueva is a 42 y.o. female who presents to the ED for Vaginal Itching (Started 4 days pt states she has had some discharge )     Denies possibility of STDs    ROS: Negative for abd pain, back pain, vomiting, or urinary complaints. Physical Exam:   Gen Appearance/Constitutional: alert  CV: regular rate     Initial Plan of Care: All treatment areas with department are currently occupied. Plan to order/Initiate the following while awaiting opening in ED: labs.     Initial Plan of Care: Initiate Treatment-Testing, Proceed toTreatment Area When Bed Available for ED Attending/MLP to Continue Care    Electronically signed by Margaret Velazquez PA-C   DD: 3/17/23       Margaret Velazquez PA-C  03/17/23 6317

## 2023-03-17 NOTE — ED PROVIDER NOTES
Independent [facility] Visit. [address]  Department of Emergency Medicine   ED  Encounter Note  Admit Date/RoomTime: 3/17/2023 12:40 PM  ED Room:     NAME: Kelsey Santiago  : 1985  MRN: [id number]     Chief Complaint:  Vaginal Itching (Started 4 days pt states she has had some discharge )    History of Present Illness       Asia Jaimes is a 42 y.o. old female who presents to the emergency department by private vehicle for vaginal discharge: white, which occured 1 week(s) prior to arrival.  Since onset the symptoms have been remaining constant and mild-moderate in severity. Symptoms are associated with dysuria and denies any abdominal pain, back pain, chest pain, shortness of breath, fever, chills, sweating, nausea, vomiting, anorexia, weight loss, diarrhea, constipation, dark/black stools, blood in stool, blood in emesis, cloudy urine, urinary frequency, hematuria, urinary urgency, urinary incontinence, or vaginal bleeding. She takes no blood thinning agents. Patient's last menstrual period was 2023 (approximate). . [id number]. GYN/STD Hx: Birth Control: None. and STD Hx: No prior history of sexually transmitted disease. .    i[employer]ul[employer]  #[id number] was used  ROS   Pertinent positives and negatives are stated within HPI, all other systems reviewed and are negative. Past Medical History:  has a past medical history of Diabetes mellitus ([address] and Ovarian cyst.    Surgical History:  has no past surgical history on file. Social History:  reports that she has never smoked. She has never used smokeless tobacco. She reports current alcohol use. She reports that she does not use drugs. Family History: family history includes Diabetes in her brother and mother. Allergies: Patient has no known allergies.     Physical Exam   Oxygen Saturation Interpretation: Normal.        ED Triage Vitals   BP Temp Temp src Heart Rate Resp SpO2 Height Weight   23 1226 23 1209 -- 03/17/23 1209 03/17/23 1209 03/17/23 1209 -- --   (!) 117/90 97.2 °F (36.2 °C)  99 17 100 %           General Appearance/Constitutional:  Alert, development consistent with age, NAD. HEENT:  NC/NT. PERRLA. Airway patent. Neck:  Supple. No lymphadenopathy. Respiratory:  Clear to auscultation and breath sounds equal.  CV:  Regular rate and rhythm. GI:  normal appearing, non-distended with no visible hernias. Bowel sounds: normal bowel sounds. Tenderness: No abdominal tenderness, guarding, rebound, rigidity or pulsatile mass. .        Liver: non-tender. Spleen:  non-tender. Back: CVA Tenderness: No CVA tenderness. : Pelvic Exam: (Same sex / third party chaperone present during examination, EMERALD Krishnan). External Genitalia: General appearance; normal, Hair distribution; normal, Lesions absent. Urethral Meatus: Size normal, Location normal, Lesions absent, Prolapse absent. Vagina: discharge small white. Cervix: normal appearing cervix without discharge or lesions and cervical motion tenderness absent. Uterus:  normal size, contour, position, consistency, mobility, non-tender. Adenexa: no tenderness bilaterally. Anus/Perineum:  normal.  Integument:  Normal turgor. Warm, dry, without visible rash, unless noted elsewhere. Lymphatics: No lymphangitis or adenopathy noted. Neurological:  Orientation age-appropriate. Motor functions intact.     Lab / Imaging Results   (All laboratory and radiology results have been personally reviewed by myself)  Labs:  Results for orders placed or performed during the hospital encounter of 03/17/23   Wet prep, genital    Specimen: Vaginal   Result Value Ref Range    Trichomonas Prep None Seen     Yeast, Wet Prep None Seen     Clue Cells, Wet Prep None Seen     Source Wet Prep VAGINAL    C.trachomatis N.gonorrhoeae DNA    Specimen: Vaginal   Result Value Ref Range    Source Endocervix    Urinalysis   Result Value Ref Range    Color, UA Yellow Straw/Yellow    Clarity, UA Clear Clear    Glucose, Ur >=1000 (A) Negative mg/dL    Bilirubin Urine Negative Negative    Ketones, Urine Negative Negative mg/dL    Specific Gravity, UA 1.020 1.005 - 1.030    Blood, Urine Negative Negative    pH, UA 6.5 5.0 - 9.0    Protein, UA Negative Negative mg/dL    Urobilinogen, Urine 0.2 <2.0 E.U./dL    Nitrite, Urine Negative Negative    Leukocyte Esterase, Urine Negative Negative   POC Pregnancy Urine Qual   Result Value Ref Range    HCG, Urine, POC Negative Negative    Lot Number [id number]     Positive QC Pass/Fail Acceptable     Negative QC Pass/Fail Acceptable      Imaging: All Radiology results interpreted by Radiologist unless otherwise noted. No orders to display     ED Course / Medical Decision Making   Medications - No data to display     Re-examination:  3/17/23       Time:1413-iPad  #[id number] . Reviewed all results and reevaluate patient. She does not want to be. Prophylactically for gonorrhea and chlamydia. Patients condition remains stable. Consults:   None    Procedures:   none    Medical Decision Making    Patient presents to the ER for vaginal irritation and small white discharge with no new sexual partners. Social Determinants include   Social Connections: Not on file    Social Determinants : None. Chronic conditions    Past Medical History:   Diagnosis Date    Diabetes mellitus ([address]     Ovarian cyst    .    Physical exam abdomen is soft and nontender. No back pain. She had small white vaginal discharge in the vaginal vault. .  Vital signs stable. Differential diagnoses include but not limited to contact there are BV versus yeast versus STD. Diagnostic studies including labs and imagining which was interpreted by radiologist reveals wet prep was negative for trichomonas, yeast and clue cells. Urine pregnancy was negative. Urinalysis was unremarkable.   She had greater than 1000 glucose which was discussed with patient. GC cultures pending at this time. . Consults included none. Results were discussed with patient. Patient will be discharged home with the following prescriptions, none. Discussed appropriate use and potential side effects of starting the prescribed medications. Patient continues to be non-toxic on re-evaluation. Findings were discussed with the patient and reasons to immediately return to the ED were articulated to them. They will follow-up with their [facility] and [facility] clinic. Discharge Instructions:   Patient referred to  1 [facility] [address] [id number]  [phone]  Schedule an appointment as soon as possible for a visit   to schedule an appt for follow up in 1-2 days    [address] [place] [address] Emergency Department  [facility]   [address] [id number]  [phone]  Go to   If symptoms worsen      MEDICATIONS:   DISCHARGE MEDICATIONS:  New Prescriptions    No medications on file       DISCONTINUED MEDICATIONS:  Discontinued Medications    No medications on file       Record Review:  Records Reviewed : None       Disposition Considerations: This patient's ED course included: a personal history and physicial examination, re-evaluation prior to disposition, and multiple bedside re-evaluations  This patient has remained hemodynamically stable during their ED course. I emphasized the importance of follow-up with the physician I referred them to in the timeframe recommended. I discussed with the patient emergent symptoms and the need to immediately return to the ER. Written information was included in their discharge instructions. Additional verbal discharge instructions were also given and discussed with the patient to supplement those generated by the EMR. We also discussed medications that were prescribed  (if any) including common side effects and interactions.  The patient was advised to abstain from driving, operating heavy machinery or making significant decisions while taking medications such as opiates and muscle relaxers that may impair this. All questions were addressed. They understand return precautions and discharge instructions. The patient  expressed understanding. Vitals were stable and they were in no distress at discharge. Plan of Care/Counseling:  ANGELO Dixon CNP reviewed today's visit with the patient in addition to providing specific details for the plan of care and counseling regarding the diagnosis and prognosis. Questions are answered at this time and are agreeable with the plan. Assessment      1. Vaginal discharge      Plan   Discharged home. Patient condition is stable    New Medications     New Prescriptions    No medications on file     Electronically signed by ANGELO Dixon CNP   DD: 3/17/23  **This report was transcribed using voice recognition software. Every effort was made to ensure accuracy; however, inadvertent computerized transcription errors may be present.   END OF ED PROVIDER NOTE       ANGELO Dixon CNP  03/17/23 [id number]

## 2023-03-19 LAB — BACTERIA UR CULT: NORMAL

## 2023-03-22 ENCOUNTER — OFFICE VISIT (OUTPATIENT)
Age: 38
End: 2023-03-22

## 2023-03-22 VITALS
TEMPERATURE: 97.9 F | DIASTOLIC BLOOD PRESSURE: 68 MMHG | SYSTOLIC BLOOD PRESSURE: 108 MMHG | HEART RATE: 84 BPM | BODY MASS INDEX: 26.84 KG/M2 | OXYGEN SATURATION: 100 % | RESPIRATION RATE: 16 BRPM | WEIGHT: 157.2 LBS | HEIGHT: 64 IN

## 2023-03-22 DIAGNOSIS — B37.31 YEAST VAGINITIS: Primary | ICD-10-CM

## 2023-03-22 DIAGNOSIS — R73.03 PREDIABETES: ICD-10-CM

## 2023-03-22 LAB
C TRACH DNA SPEC QL NAA+PROBE: NEGATIVE
HBA1C MFR BLD: 8.5 %
N GONORRHOEA DNA SPEC QL NAA+PROBE: NEGATIVE
SPECIMEN SOURCE: NORMAL

## 2023-03-22 RX ORDER — FLUCONAZOLE 150 MG/1
150 TABLET ORAL ONCE
Qty: 1 TABLET | Refills: 0 | Status: SHIPPED | OUTPATIENT
Start: 2023-03-22 | End: 2023-03-22

## 2023-03-22 SDOH — ECONOMIC STABILITY: FOOD INSECURITY: WITHIN THE PAST 12 MONTHS, THE FOOD YOU BOUGHT JUST DIDN'T LAST AND YOU DIDN'T HAVE MONEY TO GET MORE.: NEVER TRUE

## 2023-03-22 SDOH — ECONOMIC STABILITY: HOUSING INSECURITY
IN THE LAST 12 MONTHS, WAS THERE A TIME WHEN YOU DID NOT HAVE A STEADY PLACE TO SLEEP OR SLEPT IN A SHELTER (INCLUDING NOW)?: NO

## 2023-03-22 SDOH — ECONOMIC STABILITY: FOOD INSECURITY: WITHIN THE PAST 12 MONTHS, YOU WORRIED THAT YOUR FOOD WOULD RUN OUT BEFORE YOU GOT MONEY TO BUY MORE.: NEVER TRUE

## 2023-03-22 SDOH — ECONOMIC STABILITY: INCOME INSECURITY: HOW HARD IS IT FOR YOU TO PAY FOR THE VERY BASICS LIKE FOOD, HOUSING, MEDICAL CARE, AND HEATING?: NOT HARD AT ALL

## 2023-03-22 ASSESSMENT — PATIENT HEALTH QUESTIONNAIRE - PHQ9
5. POOR APPETITE OR OVEREATING: 0
SUM OF ALL RESPONSES TO PHQ QUESTIONS 1-9: 0
8. MOVING OR SPEAKING SO SLOWLY THAT OTHER PEOPLE COULD HAVE NOTICED. OR THE OPPOSITE, BEING SO FIGETY OR RESTLESS THAT YOU HAVE BEEN MOVING AROUND A LOT MORE THAN USUAL: 0
6. FEELING BAD ABOUT YOURSELF - OR THAT YOU ARE A FAILURE OR HAVE LET YOURSELF OR YOUR FAMILY DOWN: 0
SUM OF ALL RESPONSES TO PHQ QUESTIONS 1-9: 0
2. FEELING DOWN, DEPRESSED OR HOPELESS: 0
SUM OF ALL RESPONSES TO PHQ9 QUESTIONS 1 & 2: 0
SUM OF ALL RESPONSES TO PHQ QUESTIONS 1-9: 0
3. TROUBLE FALLING OR STAYING ASLEEP: 0
10. IF YOU CHECKED OFF ANY PROBLEMS, HOW DIFFICULT HAVE THESE PROBLEMS MADE IT FOR YOU TO DO YOUR WORK, TAKE CARE OF THINGS AT HOME, OR GET ALONG WITH OTHER PEOPLE: 0
7. TROUBLE CONCENTRATING ON THINGS, SUCH AS READING THE NEWSPAPER OR WATCHING TELEVISION: 0
4. FEELING TIRED OR HAVING LITTLE ENERGY: 0
9. THOUGHTS THAT YOU WOULD BE BETTER OFF DEAD, OR OF HURTING YOURSELF: 0
1. LITTLE INTEREST OR PLEASURE IN DOING THINGS: 0
SUM OF ALL RESPONSES TO PHQ QUESTIONS 1-9: 0

## 2023-03-22 ASSESSMENT — ENCOUNTER SYMPTOMS
DIARRHEA: 0
CONSTIPATION: 0
ABDOMINAL PAIN: 0
WHEEZING: 0
VOMITING: 0
COUGH: 0
SHORTNESS OF BREATH: 0
NAUSEA: 0

## 2023-03-22 NOTE — PROGRESS NOTES
heard.  Pulmonary:      Effort: Pulmonary effort is normal. No respiratory distress. Breath sounds: Normal breath sounds. No wheezing. Abdominal:      General: Bowel sounds are normal. There is no distension. Palpations: Abdomen is soft. Tenderness: There is no abdominal tenderness. Genitourinary:     Comments: Exam deferred per patient's request.  Musculoskeletal:      Right lower leg: No edema. Left lower leg: No edema. Neurological:      Mental Status: She is alert.         ___________________    Assessment & Plan :    1. Yeast vaginitis  - fluconazole (DIFLUCAN) 150 MG tablet; Take 1 tablet by mouth once for 1 dose  Dispense: 1 tablet; Refill: 0    2. Prediabetes  -Uncontrolled  -A1c today 8.5  -Discussed medication compliance with patient  -Discussed dietary and lifestyle modifications  -Continue Januvia and metformin  -No refills needed at the time  -Patient is moving to [place] in 1 week, decided not to change her medication regimen as we cannot monitor  - POCT glycosylated hemoglobin (Hb A1C)    Educational materials and/or home exercises printed for patient's review and were included in patient instructions on his/her After Visit Summary and given to patient at the end of visit. Counseled regarding above diagnosis, including possible risks and complications,  especially if left uncontrolled. Counseled regarding the possible side effects, risks, benefits and alternatives to treatment; patient and/or guardian verbalizes understanding, agrees, feels comfortable with and wishes to proceed with above treatment plan. Advised patient to call with any new medication issues, and read all Rx info from pharmacy to assure aware of all possible risks and side effects of medication before taking. Reviewed age and gender appropriate health screening exams and vaccinations.   Advised patient regarding importance of keeping up with recommended health maintenance and to schedule